# Patient Record
Sex: FEMALE | Race: WHITE | Employment: PART TIME | URBAN - METROPOLITAN AREA
[De-identification: names, ages, dates, MRNs, and addresses within clinical notes are randomized per-mention and may not be internally consistent; named-entity substitution may affect disease eponyms.]

---

## 2023-06-13 PROCEDURE — 99283 EMERGENCY DEPT VISIT LOW MDM: CPT

## 2023-06-14 ENCOUNTER — APPOINTMENT (OUTPATIENT)
Dept: GENERAL RADIOLOGY | Age: 58
End: 2023-06-14
Payer: COMMERCIAL

## 2023-06-14 ENCOUNTER — HOSPITAL ENCOUNTER (EMERGENCY)
Age: 58
Discharge: LEFT BEFORE DISCHARGE | End: 2023-06-14
Payer: COMMERCIAL

## 2023-06-14 VITALS
WEIGHT: 110 LBS | DIASTOLIC BLOOD PRESSURE: 84 MMHG | RESPIRATION RATE: 18 BRPM | HEART RATE: 81 BPM | HEIGHT: 62 IN | SYSTOLIC BLOOD PRESSURE: 135 MMHG | BODY MASS INDEX: 20.24 KG/M2 | TEMPERATURE: 98.2 F | OXYGEN SATURATION: 98 %

## 2023-06-14 DIAGNOSIS — S60.212A CONTUSION OF LEFT WRIST, INITIAL ENCOUNTER: Primary | ICD-10-CM

## 2023-06-14 PROCEDURE — 73110 X-RAY EXAM OF WRIST: CPT

## 2023-06-14 ASSESSMENT — PAIN DESCRIPTION - ORIENTATION: ORIENTATION: LEFT

## 2023-06-14 ASSESSMENT — PAIN - FUNCTIONAL ASSESSMENT: PAIN_FUNCTIONAL_ASSESSMENT: 0-10

## 2023-06-14 ASSESSMENT — PAIN DESCRIPTION - LOCATION: LOCATION: ARM

## 2023-06-14 ASSESSMENT — PAIN SCALES - GENERAL: PAINLEVEL_OUTOF10: 6

## 2023-06-14 NOTE — ED NOTES
Writer went in pts room to DC pt, pt had already left without dc vitals and after visit summary.       Claudia Cook RN  06/14/23 8912

## 2023-06-14 NOTE — DISCHARGE INSTRUCTIONS
A fracture is a break in the bone. The bone you have broken often does not show up as a fracture on x-ray until later on in the healing phase. This bone is called the scaphoid bone. With this bone, your caregiver will often cast or splint your wrist as though it is fractured, even if a fracture is not seen on the x-ray. This is often done with wrist injuries in which there is tenderness at the base of the thumb. An x-ray at 1-3 weeks after your injury may confirm this fracture. A cast or splint is used to protect and keep your injured bone in good position for healing. The cast or splint will be on generally for about 6 to 16 weeks, depending on your health, age, the fracture location and how quickly you heal. Another name for the scaphoid bone is the navicular bone. HOME CARE INSTRUCTIONS   To lessen the swelling and pain, keep the injured part elevated above your heart while sitting or lying down. Apply ice to the injury for 15 to 20 minutes, 3 to 4 times per day while awake, for 2 days. Put the ice in a plastic bag and place a thin towel between the bag of ice and your cast.  If you have a plaster or fiberglass cast or splint:  Do not try to scratch the skin under the cast using sharp or pointed objects. Check the skin around the cast every day. You may put lotion on any red or sore areas. Keep your cast or splint dry and clean. If you have a plaster splint:  Wear the splint as directed. You may loosen the elastic bandage around the splint if your fingers become numb, tingle, or turn cold or blue. If you have been put in a removable splint, wear and use as directed. Do not put pressure on any part of your cast or splint; it may deform or break. Rest your cast or splint only on a pillow the first 24 hours until it is fully hardened. Your cast or splint can be protected during bathing with a plastic bag. Do not lower the cast or splint into water.   Only take over-the-counter or prescription medicines for

## 2023-06-14 NOTE — ED PROVIDER NOTES
[address] ED  EMERGENCY DEPARTMENT ENCOUNTER        Pt Name: Penny Yarbrough  MRN: [id number]  [place] 1965  Date of evaluation: 6/13/2023  Provider: MICHAEL Dela Cruz  PCP: Celesta Koyanagi, APRN - CNP  Note Started: 12:25 AM EDT 6/14/23      ALISA. I have evaluated this patient. CHIEF COMPLAINT       Chief Complaint   Patient presents with    Wrist Injury     States happened 8 days ago when hit it on a door        HISTORY OF PRESENT ILLNESS: 1 or more Elements     History from : Patient    Limitations to history : None    Penny Yarbrough is a 59 y.o. female who presents Via private vehicle from her home for evaluation of wrist injury. Patient notes about 8 days ago she was helping her son move and she excellently got her left wrist jammed between a door frame when she was lifting a couch. She endorses pain to the lateral aspect of the left wrist.  She is right-hand dominant. She notes the pain has been getting progressively worse and now it hurts if she does any sort of supination or pronation flexion or extension. She denies any distal numbness tingling or weakness to the fingers. She has been taking ibuprofen for the pain. She denies past medical history of injury to this wrist or hand. She has no other acute concerns or complaints at this time. Nursing Notes were all reviewed and agreed with or any disagreements were addressed in the HPI. REVIEW OF SYSTEMS :      Review of Systems    Positives and Pertinent negatives as per HPI.      SURGICAL HISTORY     Past Surgical History:   Procedure Laterality Date    APPENDECTOMY      CARDIAC SURGERY      stent x1    CYSTOSCOPY  04/17/2019    CYSTOSCOPY,  URETHRAL DILATATION     CYSTOSCOPY N/A 4/17/2019    CYSTOSCOPY,  URETHRAL DILATATION performed by Cindi Salinas MD at [facility] (CERVIX STATUS UNKNOWN)      LIVER SURGERY      lacerated liver       CURRENTMEDICATIONS       Previous Medications

## 2024-04-23 ENCOUNTER — HOSPITAL ENCOUNTER (OUTPATIENT)
Dept: GENERAL RADIOLOGY | Age: 59
Discharge: HOME | End: 2024-04-23

## 2024-04-23 ENCOUNTER — HOSPITAL ENCOUNTER (OUTPATIENT)
Age: 59
Discharge: HOME | End: 2024-04-23

## 2024-04-23 DIAGNOSIS — M16.12 PRIMARY OSTEOARTHRITIS OF LEFT HIP: ICD-10-CM

## 2024-04-23 PROCEDURE — 73501 X-RAY EXAM HIP UNI 1 VIEW: CPT

## 2024-11-27 ENCOUNTER — APPOINTMENT (OUTPATIENT)
Dept: GENERAL RADIOLOGY | Age: 59
DRG: 896 | End: 2024-11-27
Payer: COMMERCIAL

## 2024-11-27 ENCOUNTER — APPOINTMENT (OUTPATIENT)
Dept: CT IMAGING | Age: 59
DRG: 896 | End: 2024-11-27
Payer: COMMERCIAL

## 2024-11-27 ENCOUNTER — HOSPITAL ENCOUNTER (INPATIENT)
Age: 59
LOS: 2 days | Discharge: ANOTHER HOSPITAL | DRG: 896 | End: 2024-11-30
Attending: EMERGENCY MEDICINE | Admitting: INTERNAL MEDICINE
Payer: COMMERCIAL

## 2024-11-27 DIAGNOSIS — F29 PSYCHOSIS, UNSPECIFIED PSYCHOSIS TYPE (HCC): ICD-10-CM

## 2024-11-27 DIAGNOSIS — R41.82 ALTERED MENTAL STATUS, UNSPECIFIED ALTERED MENTAL STATUS TYPE: Primary | ICD-10-CM

## 2024-11-27 LAB
ALBUMIN SERPL-MCNC: 3.7 G/DL (ref 3.4–5)
ALBUMIN/GLOB SERPL: 1.5 {RATIO} (ref 1.1–2.2)
ALP SERPL-CCNC: 83 U/L (ref 40–129)
ALT SERPL-CCNC: 20 U/L (ref 10–40)
AMMONIA PLAS-SCNC: 29 UMOL/L (ref 11–51)
ANION GAP SERPL CALCULATED.3IONS-SCNC: 14 MMOL/L (ref 3–16)
AST SERPL-CCNC: 29 U/L (ref 15–37)
BASOPHILS # BLD: 0.1 K/UL (ref 0–0.2)
BASOPHILS NFR BLD: 1.2 %
BILIRUB SERPL-MCNC: 0.3 MG/DL (ref 0–1)
BUN SERPL-MCNC: 13 MG/DL (ref 7–20)
CALCIUM SERPL-MCNC: 8.3 MG/DL (ref 8.3–10.6)
CHLORIDE SERPL-SCNC: 112 MMOL/L (ref 99–110)
CO2 SERPL-SCNC: 21 MMOL/L (ref 21–32)
CREAT SERPL-MCNC: 0.7 MG/DL (ref 0.6–1.2)
DEPRECATED RDW RBC AUTO: 13.6 % (ref 12.4–15.4)
EOSINOPHIL # BLD: 0.1 K/UL (ref 0–0.6)
EOSINOPHIL NFR BLD: 0.9 %
ETHANOLAMINE SERPL-MCNC: NORMAL MG/DL (ref 0–0.08)
GFR SERPLBLD CREATININE-BSD FMLA CKD-EPI: >90 ML/MIN/{1.73_M2}
GLUCOSE SERPL-MCNC: 85 MG/DL (ref 70–99)
HCT VFR BLD AUTO: 42.5 % (ref 36–48)
HGB BLD-MCNC: 14.6 G/DL (ref 12–16)
LACTATE BLDV-SCNC: 1.1 MMOL/L (ref 0.4–2)
LYMPHOCYTES # BLD: 2.1 K/UL (ref 1–5.1)
LYMPHOCYTES NFR BLD: 24.1 %
MCH RBC QN AUTO: 32.9 PG (ref 26–34)
MCHC RBC AUTO-ENTMCNC: 34.4 G/DL (ref 31–36)
MCV RBC AUTO: 95.6 FL (ref 80–100)
MONOCYTES # BLD: 0.8 K/UL (ref 0–1.3)
MONOCYTES NFR BLD: 8.6 %
NEUTROPHILS # BLD: 5.8 K/UL (ref 1.7–7.7)
NEUTROPHILS NFR BLD: 65.2 %
PATH INTERP BLD-IMP: NO
PLATELET # BLD AUTO: 193 K/UL (ref 135–450)
PLATELET BLD QL SMEAR: ADEQUATE
PMV BLD AUTO: 8.4 FL (ref 5–10.5)
POTASSIUM SERPL-SCNC: 3.6 MMOL/L (ref 3.5–5.1)
PROT SERPL-MCNC: 6.1 G/DL (ref 6.4–8.2)
RBC # BLD AUTO: 4.45 M/UL (ref 4–5.2)
SLIDE REVIEW: NORMAL
SODIUM SERPL-SCNC: 147 MMOL/L (ref 136–145)
TSH SERPL DL<=0.005 MIU/L-ACNC: 0.61 UIU/ML (ref 0.27–4.2)
WBC # BLD AUTO: 8.9 K/UL (ref 4–11)

## 2024-11-27 PROCEDURE — 93005 ELECTROCARDIOGRAM TRACING: CPT | Performed by: EMERGENCY MEDICINE

## 2024-11-27 PROCEDURE — 85025 COMPLETE CBC W/AUTO DIFF WBC: CPT

## 2024-11-27 PROCEDURE — 81001 URINALYSIS AUTO W/SCOPE: CPT

## 2024-11-27 PROCEDURE — 84443 ASSAY THYROID STIM HORMONE: CPT

## 2024-11-27 PROCEDURE — 36415 COLL VENOUS BLD VENIPUNCTURE: CPT

## 2024-11-27 PROCEDURE — 96374 THER/PROPH/DIAG INJ IV PUSH: CPT

## 2024-11-27 PROCEDURE — 80053 COMPREHEN METABOLIC PANEL: CPT

## 2024-11-27 PROCEDURE — 82140 ASSAY OF AMMONIA: CPT

## 2024-11-27 PROCEDURE — 70450 CT HEAD/BRAIN W/O DYE: CPT

## 2024-11-27 PROCEDURE — 82077 ASSAY SPEC XCP UR&BREATH IA: CPT

## 2024-11-27 PROCEDURE — 80307 DRUG TEST PRSMV CHEM ANLYZR: CPT

## 2024-11-27 PROCEDURE — 83605 ASSAY OF LACTIC ACID: CPT

## 2024-11-27 PROCEDURE — 71045 X-RAY EXAM CHEST 1 VIEW: CPT

## 2024-11-27 PROCEDURE — 6360000002 HC RX W HCPCS

## 2024-11-27 PROCEDURE — 99285 EMERGENCY DEPT VISIT HI MDM: CPT

## 2024-11-27 RX ORDER — LORAZEPAM 2 MG/ML
INJECTION INTRAMUSCULAR
Status: COMPLETED
Start: 2024-11-27 | End: 2024-11-27

## 2024-11-27 RX ORDER — LORAZEPAM 2 MG/ML
2 INJECTION INTRAMUSCULAR ONCE
Status: COMPLETED | OUTPATIENT
Start: 2024-11-27 | End: 2024-11-27

## 2024-11-27 RX ADMIN — LORAZEPAM 2 MG: 2 INJECTION INTRAMUSCULAR; INTRAVENOUS at 22:11

## 2024-11-27 RX ADMIN — LORAZEPAM 2 MG: 2 INJECTION INTRAMUSCULAR at 22:11

## 2024-11-27 ASSESSMENT — PAIN - FUNCTIONAL ASSESSMENT: PAIN_FUNCTIONAL_ASSESSMENT: ADULT NONVERBAL PAIN SCALE (NPVS)

## 2024-11-28 PROBLEM — R25.1 TREMORS OF NERVOUS SYSTEM: Status: ACTIVE | Noted: 2024-11-28

## 2024-11-28 PROBLEM — G93.40 ENCEPHALOPATHY ACUTE: Status: ACTIVE | Noted: 2024-11-28

## 2024-11-28 PROBLEM — R41.82 ALTERED MENTAL STATUS: Status: ACTIVE | Noted: 2024-11-28

## 2024-11-28 PROBLEM — F29 PSYCHOSIS, UNSPECIFIED PSYCHOSIS TYPE (HCC): Status: ACTIVE | Noted: 2024-11-28

## 2024-11-28 LAB
AMORPH SED URNS QL MICRO: ABNORMAL /HPF
AMPHETAMINES UR QL SCN>1000 NG/ML: ABNORMAL
BARBITURATES UR QL SCN>200 NG/ML: ABNORMAL
BENZODIAZ UR QL SCN>200 NG/ML: POSITIVE
BILIRUB UR QL STRIP.AUTO: ABNORMAL
CANNABINOIDS UR QL SCN>50 NG/ML: ABNORMAL
CHARACTER UR: ABNORMAL
CHP ED QC CHECK: YES
CLARITY UR: ABNORMAL
COCAINE UR QL SCN: ABNORMAL
COLOR UR: ABNORMAL
CRYSTALS URNS MICRO: ABNORMAL /HPF
DRUG SCREEN COMMENT UR-IMP: ABNORMAL
EKG ATRIAL RATE: 83 BPM
EKG DIAGNOSIS: NORMAL
EKG P AXIS: 77 DEGREES
EKG P-R INTERVAL: 178 MS
EKG Q-T INTERVAL: 382 MS
EKG QRS DURATION: 74 MS
EKG QTC CALCULATION (BAZETT): 448 MS
EKG R AXIS: 72 DEGREES
EKG T AXIS: 71 DEGREES
EKG VENTRICULAR RATE: 83 BPM
FENTANYL SCREEN, URINE: ABNORMAL
GLUCOSE BLD-MCNC: 85 MG/DL
GLUCOSE BLD-MCNC: 85 MG/DL (ref 70–99)
GLUCOSE UR STRIP.AUTO-MCNC: NEGATIVE MG/DL
HGB UR QL STRIP.AUTO: NEGATIVE
KETONES UR STRIP.AUTO-MCNC: ABNORMAL MG/DL
LEUKOCYTE ESTERASE UR QL STRIP.AUTO: NEGATIVE
METHADONE UR QL SCN>300 NG/ML: ABNORMAL
NITRITE UR QL STRIP.AUTO: NEGATIVE
OPIATES UR QL SCN>300 NG/ML: ABNORMAL
OXYCODONE UR QL SCN: POSITIVE
PCP UR QL SCN>25 NG/ML: ABNORMAL
PERFORMED ON: NORMAL
PH UR STRIP.AUTO: 6 [PH] (ref 5–8)
PH UR STRIP: 6 [PH]
PROT UR STRIP.AUTO-MCNC: 100 MG/DL
RBC #/AREA URNS HPF: ABNORMAL /HPF (ref 0–4)
SP GR UR STRIP.AUTO: >=1.03 (ref 1–1.03)
UA COMPLETE W REFLEX CULTURE PNL UR: ABNORMAL
UA DIPSTICK W REFLEX MICRO PNL UR: YES
URN SPEC COLLECT METH UR: ABNORMAL
UROBILINOGEN UR STRIP-ACNC: 1 E.U./DL
WBC #/AREA URNS HPF: ABNORMAL /HPF (ref 0–5)

## 2024-11-28 PROCEDURE — 93010 ELECTROCARDIOGRAM REPORT: CPT | Performed by: INTERNAL MEDICINE

## 2024-11-28 PROCEDURE — 6370000000 HC RX 637 (ALT 250 FOR IP)

## 2024-11-28 PROCEDURE — 2580000003 HC RX 258: Performed by: EMERGENCY MEDICINE

## 2024-11-28 PROCEDURE — 87086 URINE CULTURE/COLONY COUNT: CPT

## 2024-11-28 PROCEDURE — 6360000002 HC RX W HCPCS: Performed by: INTERNAL MEDICINE

## 2024-11-28 PROCEDURE — 2060000000 HC ICU INTERMEDIATE R&B

## 2024-11-28 PROCEDURE — 96360 HYDRATION IV INFUSION INIT: CPT

## 2024-11-28 PROCEDURE — 99223 1ST HOSP IP/OBS HIGH 75: CPT | Performed by: INTERNAL MEDICINE

## 2024-11-28 RX ORDER — ENOXAPARIN SODIUM 100 MG/ML
30 INJECTION SUBCUTANEOUS DAILY
Status: DISCONTINUED | OUTPATIENT
Start: 2024-11-29 | End: 2024-11-29

## 2024-11-28 RX ORDER — TRAZODONE HYDROCHLORIDE 50 MG/1
50 TABLET, FILM COATED ORAL NIGHTLY PRN
OUTPATIENT
Start: 2024-11-28

## 2024-11-28 RX ORDER — GUAIFENESIN 600 MG/1
1200 TABLET, EXTENDED RELEASE ORAL 2 TIMES DAILY PRN
COMMUNITY

## 2024-11-28 RX ORDER — 0.9 % SODIUM CHLORIDE 0.9 %
1000 INTRAVENOUS SOLUTION INTRAVENOUS ONCE
Status: COMPLETED | OUTPATIENT
Start: 2024-11-28 | End: 2024-11-28

## 2024-11-28 RX ORDER — MAGNESIUM SULFATE IN WATER 40 MG/ML
2000 INJECTION, SOLUTION INTRAVENOUS PRN
Status: DISCONTINUED | OUTPATIENT
Start: 2024-11-28 | End: 2024-11-30 | Stop reason: HOSPADM

## 2024-11-28 RX ORDER — FAMOTIDINE 20 MG/1
20 TABLET, FILM COATED ORAL 2 TIMES DAILY PRN
COMMUNITY

## 2024-11-28 RX ORDER — BUSPIRONE HYDROCHLORIDE 5 MG/1
7.5 TABLET ORAL 3 TIMES DAILY
COMMUNITY

## 2024-11-28 RX ORDER — DICYCLOMINE HCL 20 MG
20 TABLET ORAL EVERY 6 HOURS PRN
Status: DISCONTINUED | OUTPATIENT
Start: 2024-11-28 | End: 2024-11-30 | Stop reason: HOSPADM

## 2024-11-28 RX ORDER — SODIUM CHLORIDE 0.9 % (FLUSH) 0.9 %
5-40 SYRINGE (ML) INJECTION EVERY 12 HOURS SCHEDULED
Status: DISCONTINUED | OUTPATIENT
Start: 2024-11-28 | End: 2024-11-30 | Stop reason: HOSPADM

## 2024-11-28 RX ORDER — HYDROXYZINE HYDROCHLORIDE 50 MG/1
50 TABLET, FILM COATED ORAL 3 TIMES DAILY PRN
OUTPATIENT
Start: 2024-11-28

## 2024-11-28 RX ORDER — CARVEDILOL 6.25 MG/1
6.25 TABLET ORAL 2 TIMES DAILY WITH MEALS
COMMUNITY

## 2024-11-28 RX ORDER — SODIUM CHLORIDE 9 MG/ML
INJECTION, SOLUTION INTRAVENOUS PRN
Status: DISCONTINUED | OUTPATIENT
Start: 2024-11-28 | End: 2024-11-30 | Stop reason: HOSPADM

## 2024-11-28 RX ORDER — SODIUM CHLORIDE AND POTASSIUM CHLORIDE 150; 450 MG/100ML; MG/100ML
INJECTION, SOLUTION INTRAVENOUS CONTINUOUS
Status: DISCONTINUED | OUTPATIENT
Start: 2024-11-28 | End: 2024-11-29

## 2024-11-28 RX ORDER — ONDANSETRON 2 MG/ML
4 INJECTION INTRAMUSCULAR; INTRAVENOUS EVERY 6 HOURS PRN
Status: DISCONTINUED | OUTPATIENT
Start: 2024-11-28 | End: 2024-11-30 | Stop reason: HOSPADM

## 2024-11-28 RX ORDER — TRAZODONE HYDROCHLORIDE 150 MG/1
150 TABLET ORAL NIGHTLY
Status: ON HOLD | COMMUNITY
End: 2024-12-03 | Stop reason: HOSPADM

## 2024-11-28 RX ORDER — SODIUM CHLORIDE 0.9 % (FLUSH) 0.9 %
5-40 SYRINGE (ML) INJECTION PRN
Status: DISCONTINUED | OUTPATIENT
Start: 2024-11-28 | End: 2024-11-30 | Stop reason: HOSPADM

## 2024-11-28 RX ORDER — CLONIDINE HYDROCHLORIDE 0.1 MG/1
0.1 TABLET ORAL EVERY 6 HOURS PRN
Status: DISCONTINUED | OUTPATIENT
Start: 2024-11-28 | End: 2024-11-30 | Stop reason: HOSPADM

## 2024-11-28 RX ORDER — LORAZEPAM 2 MG/ML
2 INJECTION INTRAMUSCULAR EVERY 6 HOURS PRN
Status: DISCONTINUED | OUTPATIENT
Start: 2024-11-28 | End: 2024-11-30 | Stop reason: HOSPADM

## 2024-11-28 RX ORDER — POLYETHYLENE GLYCOL 3350 17 G/17G
17 POWDER, FOR SOLUTION ORAL DAILY PRN
Status: DISCONTINUED | OUTPATIENT
Start: 2024-11-28 | End: 2024-11-30 | Stop reason: HOSPADM

## 2024-11-28 RX ORDER — OLANZAPINE 10 MG/2ML
2.5 INJECTION, POWDER, FOR SOLUTION INTRAMUSCULAR EVERY 8 HOURS PRN
Status: DISCONTINUED | OUTPATIENT
Start: 2024-11-28 | End: 2024-11-30 | Stop reason: HOSPADM

## 2024-11-28 RX ORDER — EZETIMIBE 10 MG/1
10 TABLET ORAL DAILY
COMMUNITY

## 2024-11-28 RX ORDER — TRAZODONE HYDROCHLORIDE 50 MG/1
50 TABLET, FILM COATED ORAL NIGHTLY PRN
Status: DISCONTINUED | OUTPATIENT
Start: 2024-11-28 | End: 2024-11-30 | Stop reason: HOSPADM

## 2024-11-28 RX ORDER — ONDANSETRON 4 MG/1
4 TABLET, ORALLY DISINTEGRATING ORAL EVERY 8 HOURS PRN
Status: DISCONTINUED | OUTPATIENT
Start: 2024-11-28 | End: 2024-11-30 | Stop reason: HOSPADM

## 2024-11-28 RX ORDER — BACLOFEN 10 MG/1
10 TABLET ORAL 2 TIMES DAILY PRN
Status: ON HOLD | COMMUNITY
End: 2024-12-03 | Stop reason: HOSPADM

## 2024-11-28 RX ORDER — LORAZEPAM 2 MG/1
2 TABLET ORAL
OUTPATIENT
Start: 2024-11-28 | End: 2024-11-29

## 2024-11-28 RX ORDER — OLANZAPINE 5 MG/1
5 TABLET ORAL 3 TIMES DAILY PRN
OUTPATIENT
Start: 2024-11-28

## 2024-11-28 RX ORDER — HYDROXYZINE HYDROCHLORIDE 50 MG/1
50 TABLET, FILM COATED ORAL EVERY 8 HOURS PRN
Status: DISCONTINUED | OUTPATIENT
Start: 2024-11-28 | End: 2024-11-30 | Stop reason: HOSPADM

## 2024-11-28 RX ORDER — CLOPIDOGREL BISULFATE 75 MG/1
75 TABLET ORAL DAILY
Status: DISCONTINUED | OUTPATIENT
Start: 2024-11-29 | End: 2024-11-30 | Stop reason: HOSPADM

## 2024-11-28 RX ORDER — BUPRENORPHINE HYDROCHLORIDE AND NALOXONE HYDROCHLORIDE DIHYDRATE 8; 2 MG/1; MG/1
1 TABLET SUBLINGUAL DAILY
COMMUNITY

## 2024-11-28 RX ORDER — ACETAMINOPHEN 325 MG/1
650 TABLET ORAL EVERY 8 HOURS PRN
OUTPATIENT
Start: 2024-11-28

## 2024-11-28 RX ORDER — VENLAFAXINE HYDROCHLORIDE 75 MG/1
75 CAPSULE, EXTENDED RELEASE ORAL DAILY
Status: ON HOLD | COMMUNITY
End: 2024-12-03 | Stop reason: HOSPADM

## 2024-11-28 RX ORDER — POTASSIUM CHLORIDE 7.45 MG/ML
10 INJECTION INTRAVENOUS PRN
Status: DISCONTINUED | OUTPATIENT
Start: 2024-11-28 | End: 2024-11-30 | Stop reason: HOSPADM

## 2024-11-28 RX ORDER — ACETAMINOPHEN 650 MG/1
650 SUPPOSITORY RECTAL EVERY 6 HOURS PRN
Status: DISCONTINUED | OUTPATIENT
Start: 2024-11-28 | End: 2024-11-30 | Stop reason: HOSPADM

## 2024-11-28 RX ORDER — ATOMOXETINE 25 MG/1
25 CAPSULE ORAL DAILY
Status: ON HOLD | COMMUNITY
End: 2024-12-03 | Stop reason: HOSPADM

## 2024-11-28 RX ORDER — POLYETHYLENE GLYCOL 3350 17 G
2 POWDER IN PACKET (EA) ORAL
OUTPATIENT
Start: 2024-11-28

## 2024-11-28 RX ORDER — POTASSIUM CHLORIDE 1500 MG/1
40 TABLET, EXTENDED RELEASE ORAL PRN
Status: DISCONTINUED | OUTPATIENT
Start: 2024-11-28 | End: 2024-11-30 | Stop reason: HOSPADM

## 2024-11-28 RX ORDER — ACETAMINOPHEN 325 MG/1
650 TABLET ORAL EVERY 6 HOURS PRN
Status: DISCONTINUED | OUTPATIENT
Start: 2024-11-28 | End: 2024-11-30 | Stop reason: HOSPADM

## 2024-11-28 RX ORDER — BUPRENORPHINE HYDROCHLORIDE AND NALOXONE HYDROCHLORIDE DIHYDRATE 8; 2 MG/1; MG/1
3 TABLET SUBLINGUAL DAILY
Status: DISCONTINUED | OUTPATIENT
Start: 2024-11-28 | End: 2024-11-30 | Stop reason: HOSPADM

## 2024-11-28 RX ADMIN — BUPRENORPHINE HYDROCHLORIDE AND NALOXONE HYDROCHLORIDE DIHYDRATE 3 TABLET: 8; 2 TABLET SUBLINGUAL at 18:14

## 2024-11-28 RX ADMIN — Medication: at 21:25

## 2024-11-28 RX ADMIN — SODIUM CHLORIDE 1000 ML: 9 INJECTION, SOLUTION INTRAVENOUS at 03:09

## 2024-11-28 RX ADMIN — OLANZAPINE 2.5 MG: 10 INJECTION, POWDER, LYOPHILIZED, FOR SOLUTION INTRAMUSCULAR at 15:20

## 2024-11-28 RX ADMIN — ONDANSETRON 4 MG: 2 INJECTION INTRAMUSCULAR; INTRAVENOUS at 18:42

## 2024-11-28 RX ADMIN — POTASSIUM CHLORIDE AND SODIUM CHLORIDE: 450; 150 INJECTION, SOLUTION INTRAVENOUS at 14:40

## 2024-11-28 RX ADMIN — LORAZEPAM 2 MG: 2 INJECTION INTRAMUSCULAR; INTRAVENOUS at 11:13

## 2024-11-28 NOTE — PROGRESS NOTES
Patient in bed awake c/o tremors/ feeling anxious and having abdomen pain.Pt. very restless In bed. PRN Ativan given per order.

## 2024-11-28 NOTE — PROGRESS NOTES
Patient stating she felt like she needed to urinate, patient assisted to BSC, unable to urinate/complaint of pain. RN bladder scanned patient only at 188ml. Fluids started per order. Will monitor.

## 2024-11-28 NOTE — H&P
Hospital Medicine History & Physical      PCP: Steffany Argueta, PHILLIP - CNP    Date of Admission: 2024    Date of Service: Pt seen/examined on 24 and Admitted to Inpatient with expected LOS greater than two midnights due to medical therapy.     Chief Complaint:  Altered mental status    History Of Present Illness:      Mayra Cavanaugh is a 59 y.o. female with past medical history as below, including CAD, substance abuse on suboxone, who presents with altered mental status. Per ER report family called EMS because patient was not acting like herself and talking to her father who is . Patient combative, screaming, and fighting staff. They report she has history of drug misuse but has been on treatment with suboxone. Patient was running around her home naked. She was evaluated in the ER for admission to psychiatry. However subsequently patient had a brief shaking episode and was still very confused. While not definitely seizure activity the patient was felt not safe to go to psychiatry due to possible seizure activity.    On further history patient's family return to hospital they said they search for her medications at home and her suboxone bottle has too many pills in it.    Patient seen. She sits upright suddenly in bed appearing very anxious. She denies pain, cough, or dyspnea, endorses some nausea. She is also very tearful. She answers ROS questions appropriately but does not answer when asked as to what happened this morning or why she came to the hospital. Denies alcohol use, says she takes suboxone daily but does not answer as to other drugs or medications.     Past Medical History:          Diagnosis Date    Abnormal cardiac valve     Hemorrhoid     Kidney stone     MI (myocardial infarction) (HCC)     Migraine     Postoperative nausea and vomiting        Past Surgical History:          Procedure Laterality Date    APPENDECTOMY      CARDIAC SURGERY      stent x1    CYSTOSCOPY

## 2024-11-28 NOTE — ED NOTES
Pt shaking with continued intermittent erratic movements. Pt unable to verbalize. Pt does not follow commands. Dr Pollard to bedside. Pt to be admitted to medical rather than behavioral at this time.

## 2024-11-28 NOTE — ED NOTES
Pt awakens intermittently with erratic movements. Pt will not verbalize. Pt does not follow commands. Pt laying on side resting at this time.

## 2024-11-28 NOTE — PROGRESS NOTES
Patient back up to BSC able to urinate. 100ml of tea/malodorous urine noted. Patient denies any further needs at this time,call light within reach.

## 2024-11-28 NOTE — ED NOTES
Transfer Center Handoff for Behavioral Health Transfers      Patient's Current Location: [facility] ED     Chief Complaint   Patient presents with    Altered Mental Status     Pt arrives via EMS for altered mental status. Pt was not acting herself at home. Pt hx drug abuse. Family concerned with relapse. Pt combative in squad. Given 5mg IM versed        Current or History of Violent Behavior: Yes    Currently in Restraints Now or During this Encounter: Yes  (Specify if Agitation or self harm is noted in ED?)  If yes, please describe behaviors requiring restraint: in restraints in EMS, not in ER            Medical Clearance Documented and Verified in the Chart: Yes    Allergies   Allergen Reactions    Bee Venom Swelling    Sumatriptan Other (See Comments)     Jaw pain    Tramadol      Can take vicodin/percocet    Butalbital-Apap-Caffeine Nausea And Vomiting    Pcn [Penicillins] Rash        Can Patient Tolerate Lying Flat: Yes    Able to Perform ADLs:  Yes  (Specify if able to ambulate or uses any mobility devices such as cane or walker)  Activity:    Level of Assistance:    Assistive Device:    Miscellaneous Devices:      LABS    CBC:   Lab Results   Component Value Date/Time    WBC 8.9 11/27/2024 10:23 PM    RBC 4.45 11/27/2024 10:23 PM    HGB 14.6 11/27/2024 10:23 PM    HCT 42.5 11/27/2024 10:23 PM    MCV 95.6 11/27/2024 10:23 PM    MCH 32.9 11/27/2024 10:23 PM    MCHC 34.4 11/27/2024 10:23 PM    RDW 13.6 11/27/2024 10:23 PM     11/27/2024 10:23 PM    MPV 8.4 11/27/2024 10:23 PM     CMP:   Lab Results   Component Value Date/Time     11/27/2024 10:23 PM    K 3.6 11/27/2024 10:23 PM     11/27/2024 10:23 PM    CO2 21 11/27/2024 10:23 PM    BUN 13 11/27/2024 10:23 PM    CREATININE 0.7 11/27/2024 10:23 PM    GFRAA >60 08/26/2021 08:15 AM    GFRAA >60 03/29/2013 02:24 PM    AGRATIO 1.5 11/27/2024 10:23 PM    LABGLOM >90 11/27/2024 10:23 PM    GLUCOSE 85 11/27/2024 10:23 PM    CALCIUM 8.3

## 2024-11-28 NOTE — PLAN OF CARE
Problem: Pain  Goal: Verbalizes/displays adequate comfort level or baseline comfort level  Outcome: Progressing  Flowsheets (Taken 11/28/2024 9691)  Verbalizes/displays adequate comfort level or baseline comfort level: Encourage patient to monitor pain and request assistance

## 2024-11-28 NOTE — VIRTUAL HEALTH
ManuelitoMayra Cavanaugh  [id number]  1965     Social Work Behavioral Health Crisis Assessment    24    Chief Complaint: per chart \"Pt arrives via EMS for altered mental status. Pt was not acting herself at home. Pt hx drug abuse. Family concerned with relapse. Pt combative in squad. Given 5mg IM versed.\"    HPI: Patient is a 59 y.o. White (non-) female who presents for psych eval. Patient presented to the ED on 24 from EMS. Family called due to altered mental status, patient not acting like herself & talking to her father who is . Patient combative, screaming, & fighting staff. She has a history of drug misuse & being sober for 3 months. Patient was running around her home naked.    Past Psychiatric History:  Previous Diagnoses/symptoms: Denies  Previous suicide attempts/self-harm: Denies  Inpatient psychiatric hospitalizations: denies  Current outpatient psychiatric provider: Denies  Current therapist: States not in therapy  Previous psychiatric medication trials: unknown  Current psychiatric medications: No current psychiatric medications  Family Psychiatric History: Denies    Sleep Hours: states sleeping ok    Sleep concerns: denies    Use of sleep medications: denies    Substance Abuse History:  Tobacco: Denies  Alcohol: Denies  Marijuana: Denies  Stimulant: Denies  Opiates: Denies  Benzodiazepine: Denies  Other illicit drug usage: Denies  History of substance/alcohol abuse treatment: Denies    Social History:  Education: H.S.  Living Situation/Interest: with family  Marital/Committed relationship and parenting hx: single; no children  Occupation: Unemployed  Legal History/Hx of Violence: Denies  Spiritual History: Denies  Psychological trauma, neglect, or abuse: denies hx of trauma/abuse   Access to guns or other weapons: denies having access to firearms/dangerous weapons     Past Medical History:  Active Ambulatory Problems     Diagnosis Date Noted    Chest pain 2021  stable  Safety plan created and reviewed with patient, see below for details  Re-consult for any new changes or concerns. Thank you for this consult.  Discussed recommendations with Dr. Pollard at time of consult completion.    TelePsych recommendations:Inpatient psychiatric admission      Safety Plan:  see below        Electronically signed by JUAN Pike on 11/28/2024 at 3:37 AM.       Mayra Cavanaugh, was evaluated through a synchronous (real-time) audio-video encounter. The patient (and/or guardian if applicable) is aware that this is a billable service, which includes applicable co-pays. This virtual visit was conducted with patient's (and/or legal guardian's) consent. Patient identification was verified, and a caregiver was present when appropriate.  The patient was located at Facility (Appt Department): [place] [address]  Loc: [phone]  The provider was located at Home (City/State): [place]  Confirm you are appropriately licensed, registered, or certified to deliver care in the state where the patient is located as indicated above. If you are not or unsure, please re-schedule the visit: Yes, I confirm.   [place] Consult to Tele-Psych  Consult performed by: Lisa Harry LISW  Consult ordered by: Tonya Pollard MD  Reason for consult: psychosis           Total time spent on this encounter: Not billed by time    --JUAN Pike on 11/28/2024 at 3:36 AM    An electronic signature was used to authenticate this note.

## 2024-11-28 NOTE — ED NOTES
Pt arrives via EMS for altered mental status. Pt family called due to pt not acting herself and talking to her father who is . Pt given IM versed in EMS due to pt being combative and uncooperative. Pt arrives uncooperative with staff. Dr Pollard to pt bedside.

## 2024-11-28 NOTE — ED PROVIDER NOTES
[facility] ED  EMERGENCY DEPARTMENT ENCOUNTER      Pt Name: Mayra Cavanaugh  MRN: [id number]  Birthdate 1965  Date of evaluation: 11/27/2024  Provider: Tonya Pollard MD    CHIEF COMPLAINT       Chief Complaint   Patient presents with    Altered Mental Status     Pt arrives via EMS for altered mental status. Pt was not acting herself at home. Pt hx drug abuse. Family concerned with relapse. Pt combative in squad. Given 5mg IM versed          HISTORY OF PRESENT ILLNESS   (Location/Symptom, Timing/Onset, Context/Setting, Quality, Duration, Modifying Factors, Severity)  Note limiting factors.   Mayra Cavanaugh is a 59 y.o. female who presents to the emergency department with altered mental status.  Patient has a history of drug abuse with 3 months of sobriety.  Patient was at home was apparently running around the house with no clothes on and talking to her dead father.  Upon getting into the EMS truck, patient became pugilistic and required a dose of Versed IM for safety of patient and EMS crew.  Patient continues combative in the emergency department screaming no and fighting with staff.  Unable to provide additional history.      This patient is at risk for a communicable infection. Therefore, personal protection equipment consisting of a mask and gloves worn for the exam.     Nursing Notes were reviewed.    REVIEW OF SYSTEMS    (2-9 systems for level 4, 10 or more for level 5)     As per HPI    Except as noted above the remainder of the review of systems was reviewed and negative.       PAST MEDICAL HISTORY     Past Medical History:   Diagnosis Date    Abnormal cardiac valve     Hemorrhoid     Kidney stone     MI (myocardial infarction) (HCC)     Migraine     Postoperative nausea and vomiting          SURGICAL HISTORY       Past Surgical History:   Procedure Laterality Date    APPENDECTOMY      CARDIAC SURGERY      stent x1    CYSTOSCOPY  04/17/2019    CYSTOSCOPY,  URETHRAL DILATATION

## 2024-11-28 NOTE — FLOWSHEET NOTE
11/28/24 1000   Vitals   Temp 98.1 °F (36.7 °C)   Temp Source Oral   Pulse 99   Heart Rate Source Monitor   Respirations 19   /65   MAP (Calculated) 84   Oxygen Therapy   SpO2 98 %   O2 Device None (Room air)     Patient admitted to room 321 from ER. Patient oriented to room, call light, bed rails, phone, lights and bathroom. Patient instructed about the schedule of the day including: vital sign frequency, lab draws, possible tests, frequency of MD and staff rounds, daily weights, I &O's and prescribed diet.  Telemetry box in place, patient aware of placement and reason. Bed locked, in lowest position, side rails up 2/4, call light within reach.        Recliner Assessment  Patient is able to demonstrate the ability to move from a reclining position to an upright position within the recliner.       4 Eyes Skin Assessment     NAME:  Mayra Cavanaugh  YOB: 1965  MEDICAL RECORD NUMBER:  [id number]    The patient is being assessed for  Admission    I agree that at least one RN has performed a thorough Head to Toe Skin Assessment on the patient. ALL assessment sites listed below have been assessed.      Areas assessed by both nurses:    Head, Face, Ears, Shoulders, Back, Chest, Arms, Elbows, Hands, Sacrum. Buttock, Coccyx, Ischium, and Legs. Feet and Heels        Does the Patient have a Wound? No noted wound(s)       Miky Prevention initiated by RN: No  Wound Care Orders initiated by RN: No    Pressure Injury (Stage 3,4, Unstageable, DTI, NWPT, and Complex wounds) if present, place Wound referral order by RN under : No    New Ostomies, if present place, Ostomy referral order under : No     Nurse 1 eSignature: Electronically signed by Leslie Viera RN on 11/28/24 at 1:06 PM EST    **SHARE this note so that the co-signing nurse can place an eSignature**    Nurse 2 eSignature: Electronically signed by LINCOLN AN RN on 11/28/24 at 1:45 PM EST

## 2024-11-28 NOTE — FLOWSHEET NOTE
11/28/24 1731   Vitals   Temp 99.1 °F (37.3 °C)   Temp Source Oral   Pulse 95   Heart Rate Source Monitor   Respirations 16   /65   MAP (Calculated) 85   BP Location Right upper arm   BP Upper/Lower Upper   BP Method Automatic   Oxygen Therapy   SpO2 95 %   O2 Device None (Room air)     Patient in bed resting with eyes closed. Vitals obtained. Patient occasionally awakens yelling No stop it. Notified MD that med rec is updated. Family brought medication in.  No further needs expressed at this time,call light within reach and family at bedside.

## 2024-11-28 NOTE — PROGRESS NOTES
Patient awake sitting up in bed. Able to tell RN her name and birthday. Disoriented to place and situation. Patient does not remember coming to hospital or what happened at home. Able to answer some admission questions,other questions completed by patient Sister Cheyanne and Son Pedro.    Patient does state she goes to [facility] to get her suboxone she is prescribed, Notified MD, Unable to verify prescription r/t closed on holiday.

## 2024-11-29 PROBLEM — Z95.5 PRESENCE OF DRUG COATED STENT IN LEFT CIRCUMFLEX CORONARY ARTERY: Chronic | Status: ACTIVE | Noted: 2017-12-07

## 2024-11-29 PROBLEM — G25.81 RESTLESS LEGS SYNDROME (RLS): Status: ACTIVE | Noted: 2018-02-05

## 2024-11-29 PROBLEM — I25.118 CORONARY ARTERY DISEASE OF NATIVE ARTERY OF NATIVE HEART WITH STABLE ANGINA PECTORIS (HCC): Status: ACTIVE | Noted: 2017-12-07

## 2024-11-29 PROBLEM — E53.8 B12 DEFICIENCY: Chronic | Status: ACTIVE | Noted: 2024-06-03

## 2024-11-29 PROBLEM — E55.9 VITAMIN D DEFICIENCY: Chronic | Status: ACTIVE | Noted: 2024-06-03

## 2024-11-29 PROBLEM — E78.2 MIXED HYPERLIPIDEMIA: Status: ACTIVE | Noted: 2017-12-07

## 2024-11-29 PROBLEM — I35.0 NONRHEUMATIC AORTIC VALVE STENOSIS: Chronic | Status: ACTIVE | Noted: 2018-02-21

## 2024-11-29 PROBLEM — Q24.5 CORONARY-MYOCARDIAL BRIDGE: Chronic | Status: ACTIVE | Noted: 2017-12-07

## 2024-11-29 LAB
25(OH)D3 SERPL-MCNC: 61 NG/ML
ANION GAP SERPL CALCULATED.3IONS-SCNC: 15 MMOL/L (ref 3–16)
BASOPHILS # BLD: 0.1 K/UL (ref 0–0.2)
BASOPHILS NFR BLD: 0.8 %
BILIRUB UR QL STRIP.AUTO: NEGATIVE
BUN SERPL-MCNC: 22 MG/DL (ref 7–20)
CALCIUM SERPL-MCNC: 8.1 MG/DL (ref 8.3–10.6)
CHLORIDE SERPL-SCNC: 105 MMOL/L (ref 99–110)
CLARITY UR: CLEAR
CO2 SERPL-SCNC: 18 MMOL/L (ref 21–32)
COLOR UR: YELLOW
CREAT SERPL-MCNC: 0.7 MG/DL (ref 0.6–1.2)
DEPRECATED RDW RBC AUTO: 13.7 % (ref 12.4–15.4)
EOSINOPHIL # BLD: 0.1 K/UL (ref 0–0.6)
EOSINOPHIL NFR BLD: 0.6 %
FOLATE SERPL-MCNC: 26.4 NG/ML (ref 4.78–24.2)
GFR SERPLBLD CREATININE-BSD FMLA CKD-EPI: >90 ML/MIN/{1.73_M2}
GLUCOSE BLD-MCNC: 154 MG/DL (ref 70–99)
GLUCOSE BLD-MCNC: 51 MG/DL (ref 70–99)
GLUCOSE SERPL-MCNC: 138 MG/DL (ref 70–99)
GLUCOSE UR STRIP.AUTO-MCNC: NEGATIVE MG/DL
HCT VFR BLD AUTO: 39.3 % (ref 36–48)
HGB BLD-MCNC: 13.4 G/DL (ref 12–16)
HGB UR QL STRIP.AUTO: NEGATIVE
KETONES UR STRIP.AUTO-MCNC: ABNORMAL MG/DL
LEUKOCYTE ESTERASE UR QL STRIP.AUTO: NEGATIVE
LYMPHOCYTES # BLD: 3.6 K/UL (ref 1–5.1)
LYMPHOCYTES NFR BLD: 27.2 %
MCH RBC QN AUTO: 32.8 PG (ref 26–34)
MCHC RBC AUTO-ENTMCNC: 34.2 G/DL (ref 31–36)
MCV RBC AUTO: 96.1 FL (ref 80–100)
MONOCYTES # BLD: 1.1 K/UL (ref 0–1.3)
MONOCYTES NFR BLD: 8.1 %
NEUTROPHILS # BLD: 8.4 K/UL (ref 1.7–7.7)
NEUTROPHILS NFR BLD: 63.3 %
NITRITE UR QL STRIP.AUTO: NEGATIVE
PERFORMED ON: ABNORMAL
PERFORMED ON: ABNORMAL
PH UR STRIP.AUTO: 5.5 [PH] (ref 5–8)
PLATELET # BLD AUTO: 251 K/UL (ref 135–450)
PMV BLD AUTO: 7.9 FL (ref 5–10.5)
POTASSIUM SERPL-SCNC: 3.6 MMOL/L (ref 3.5–5.1)
PROCALCITONIN SERPL IA-MCNC: 0.03 NG/ML (ref 0–0.15)
PROT UR STRIP.AUTO-MCNC: NEGATIVE MG/DL
RBC # BLD AUTO: 4.09 M/UL (ref 4–5.2)
RBC #/AREA URNS HPF: NORMAL /HPF (ref 0–4)
SODIUM SERPL-SCNC: 138 MMOL/L (ref 136–145)
SP GR UR STRIP.AUTO: 1.01 (ref 1–1.03)
UA COMPLETE W REFLEX CULTURE PNL UR: ABNORMAL
UA DIPSTICK W REFLEX MICRO PNL UR: ABNORMAL
URN SPEC COLLECT METH UR: ABNORMAL
URN SPEC COLLECT METH UR: NORMAL
UROBILINOGEN UR STRIP-ACNC: 0.2 E.U./DL
VIT B12 SERPL-MCNC: 887 PG/ML (ref 211–911)
WBC # BLD AUTO: 13.3 K/UL (ref 4–11)
WBC #/AREA URNS HPF: NORMAL /HPF (ref 0–5)

## 2024-11-29 PROCEDURE — 6370000000 HC RX 637 (ALT 250 FOR IP): Performed by: NURSE PRACTITIONER

## 2024-11-29 PROCEDURE — 2580000003 HC RX 258: Performed by: STUDENT IN AN ORGANIZED HEALTH CARE EDUCATION/TRAINING PROGRAM

## 2024-11-29 PROCEDURE — 6360000002 HC RX W HCPCS: Performed by: STUDENT IN AN ORGANIZED HEALTH CARE EDUCATION/TRAINING PROGRAM

## 2024-11-29 PROCEDURE — 84145 PROCALCITONIN (PCT): CPT

## 2024-11-29 PROCEDURE — 80048 BASIC METABOLIC PNL TOTAL CA: CPT

## 2024-11-29 PROCEDURE — 82746 ASSAY OF FOLIC ACID SERUM: CPT

## 2024-11-29 PROCEDURE — 99232 SBSQ HOSP IP/OBS MODERATE 35: CPT | Performed by: NURSE PRACTITIONER

## 2024-11-29 PROCEDURE — 86701 HIV-1ANTIBODY: CPT

## 2024-11-29 PROCEDURE — 83921 ORGANIC ACID SINGLE QUANT: CPT

## 2024-11-29 PROCEDURE — 6370000000 HC RX 637 (ALT 250 FOR IP): Performed by: INTERNAL MEDICINE

## 2024-11-29 PROCEDURE — 84425 ASSAY OF VITAMIN B-1: CPT

## 2024-11-29 PROCEDURE — 6360000002 HC RX W HCPCS: Performed by: INTERNAL MEDICINE

## 2024-11-29 PROCEDURE — 86780 TREPONEMA PALLIDUM: CPT

## 2024-11-29 PROCEDURE — 36415 COLL VENOUS BLD VENIPUNCTURE: CPT

## 2024-11-29 PROCEDURE — 85025 COMPLETE CBC W/AUTO DIFF WBC: CPT

## 2024-11-29 PROCEDURE — 86702 HIV-2 ANTIBODY: CPT

## 2024-11-29 PROCEDURE — 2060000000 HC ICU INTERMEDIATE R&B

## 2024-11-29 PROCEDURE — 87390 HIV-1 AG IA: CPT

## 2024-11-29 PROCEDURE — 82306 VITAMIN D 25 HYDROXY: CPT

## 2024-11-29 PROCEDURE — 2580000003 HC RX 258: Performed by: NURSE PRACTITIONER

## 2024-11-29 PROCEDURE — 99223 1ST HOSP IP/OBS HIGH 75: CPT | Performed by: STUDENT IN AN ORGANIZED HEALTH CARE EDUCATION/TRAINING PROGRAM

## 2024-11-29 PROCEDURE — 99223 1ST HOSP IP/OBS HIGH 75: CPT | Performed by: PSYCHIATRY & NEUROLOGY

## 2024-11-29 PROCEDURE — 81003 URINALYSIS AUTO W/O SCOPE: CPT

## 2024-11-29 PROCEDURE — 82607 VITAMIN B-12: CPT

## 2024-11-29 RX ORDER — UBIDECARENONE 75 MG
50 CAPSULE ORAL DAILY
Status: DISCONTINUED | OUTPATIENT
Start: 2024-11-29 | End: 2024-11-30 | Stop reason: HOSPADM

## 2024-11-29 RX ORDER — ENOXAPARIN SODIUM 100 MG/ML
40 INJECTION SUBCUTANEOUS DAILY
Status: DISCONTINUED | OUTPATIENT
Start: 2024-11-30 | End: 2024-11-30 | Stop reason: HOSPADM

## 2024-11-29 RX ORDER — BACLOFEN 10 MG/1
10 TABLET ORAL 2 TIMES DAILY PRN
Status: DISCONTINUED | OUTPATIENT
Start: 2024-11-29 | End: 2024-11-30 | Stop reason: HOSPADM

## 2024-11-29 RX ORDER — LANOLIN ALCOHOL/MO/W.PET/CERES
100 CREAM (GRAM) TOPICAL DAILY
Status: DISCONTINUED | OUTPATIENT
Start: 2024-11-29 | End: 2024-11-30 | Stop reason: HOSPADM

## 2024-11-29 RX ORDER — VENLAFAXINE HYDROCHLORIDE 75 MG/1
75 CAPSULE, EXTENDED RELEASE ORAL DAILY
Status: DISCONTINUED | OUTPATIENT
Start: 2024-11-29 | End: 2024-11-30 | Stop reason: HOSPADM

## 2024-11-29 RX ORDER — NICOTINE 21 MG/24HR
1 PATCH, TRANSDERMAL 24 HOURS TRANSDERMAL DAILY
Status: DISCONTINUED | OUTPATIENT
Start: 2024-11-29 | End: 2024-11-30 | Stop reason: HOSPADM

## 2024-11-29 RX ORDER — ATOMOXETINE 25 MG/1
25 CAPSULE ORAL DAILY
Status: DISCONTINUED | OUTPATIENT
Start: 2024-11-29 | End: 2024-11-30 | Stop reason: HOSPADM

## 2024-11-29 RX ORDER — DEXTROSE MONOHYDRATE 100 MG/ML
INJECTION, SOLUTION INTRAVENOUS CONTINUOUS PRN
Status: DISCONTINUED | OUTPATIENT
Start: 2024-11-29 | End: 2024-11-30 | Stop reason: HOSPADM

## 2024-11-29 RX ORDER — BUSPIRONE HYDROCHLORIDE 7.5 MG/1
7.5 TABLET ORAL 3 TIMES DAILY
Status: DISCONTINUED | OUTPATIENT
Start: 2024-11-29 | End: 2024-11-30 | Stop reason: HOSPADM

## 2024-11-29 RX ORDER — CARVEDILOL 6.25 MG/1
6.25 TABLET ORAL 2 TIMES DAILY WITH MEALS
Status: DISCONTINUED | OUTPATIENT
Start: 2024-11-29 | End: 2024-11-30 | Stop reason: HOSPADM

## 2024-11-29 RX ORDER — LORAZEPAM 2 MG/ML
0.5 INJECTION INTRAMUSCULAR
Status: DISCONTINUED | OUTPATIENT
Start: 2024-11-29 | End: 2024-11-30 | Stop reason: HOSPADM

## 2024-11-29 RX ORDER — SODIUM CHLORIDE 9 MG/ML
INJECTION, SOLUTION INTRAVENOUS CONTINUOUS
Status: DISCONTINUED | OUTPATIENT
Start: 2024-11-29 | End: 2024-11-30 | Stop reason: HOSPADM

## 2024-11-29 RX ORDER — ROSUVASTATIN CALCIUM 10 MG/1
40 TABLET, COATED ORAL NIGHTLY
Status: DISCONTINUED | OUTPATIENT
Start: 2024-11-29 | End: 2024-11-30 | Stop reason: HOSPADM

## 2024-11-29 RX ORDER — EZETIMIBE 10 MG/1
10 TABLET ORAL DAILY
Status: DISCONTINUED | OUTPATIENT
Start: 2024-11-29 | End: 2024-11-30 | Stop reason: HOSPADM

## 2024-11-29 RX ORDER — THIAMINE HYDROCHLORIDE 100 MG/ML
500 INJECTION, SOLUTION INTRAMUSCULAR; INTRAVENOUS 3 TIMES DAILY
Status: DISCONTINUED | OUTPATIENT
Start: 2024-11-29 | End: 2024-11-29 | Stop reason: SDUPTHER

## 2024-11-29 RX ORDER — ROPINIROLE 0.25 MG/1
0.25 TABLET, FILM COATED ORAL EVERY EVENING
Status: DISCONTINUED | OUTPATIENT
Start: 2024-11-29 | End: 2024-11-30 | Stop reason: HOSPADM

## 2024-11-29 RX ORDER — GLUCAGON 1 MG/ML
1 KIT INJECTION PRN
Status: DISCONTINUED | OUTPATIENT
Start: 2024-11-29 | End: 2024-11-30 | Stop reason: HOSPADM

## 2024-11-29 RX ADMIN — HYDROXYZINE HYDROCHLORIDE 50 MG: 50 TABLET, FILM COATED ORAL at 09:25

## 2024-11-29 RX ADMIN — BUPRENORPHINE HYDROCHLORIDE AND NALOXONE HYDROCHLORIDE DIHYDRATE 3 TABLET: 8; 2 TABLET SUBLINGUAL at 09:23

## 2024-11-29 RX ADMIN — ENOXAPARIN SODIUM 30 MG: 100 INJECTION SUBCUTANEOUS at 09:24

## 2024-11-29 RX ADMIN — SODIUM CHLORIDE: 9 INJECTION, SOLUTION INTRAVENOUS at 09:32

## 2024-11-29 RX ADMIN — LORAZEPAM 2 MG: 2 INJECTION INTRAMUSCULAR; INTRAVENOUS at 21:17

## 2024-11-29 RX ADMIN — LORAZEPAM 2 MG: 2 INJECTION INTRAMUSCULAR; INTRAVENOUS at 02:42

## 2024-11-29 RX ADMIN — CARVEDILOL 6.25 MG: 6.25 TABLET, FILM COATED ORAL at 09:24

## 2024-11-29 RX ADMIN — HYDROXYZINE HYDROCHLORIDE 50 MG: 50 TABLET, FILM COATED ORAL at 18:46

## 2024-11-29 RX ADMIN — OLANZAPINE 2.5 MG: 10 INJECTION, POWDER, LYOPHILIZED, FOR SOLUTION INTRAMUSCULAR at 03:49

## 2024-11-29 RX ADMIN — Medication 100 MG: at 10:53

## 2024-11-29 RX ADMIN — CLOPIDOGREL BISULFATE 75 MG: 75 TABLET ORAL at 09:24

## 2024-11-29 RX ADMIN — LINACLOTIDE 145 MCG: 145 CAPSULE, GELATIN COATED ORAL at 09:24

## 2024-11-29 RX ADMIN — Medication 50 MCG: at 10:52

## 2024-11-29 RX ADMIN — TRAZODONE HYDROCHLORIDE 50 MG: 50 TABLET ORAL at 23:38

## 2024-11-29 RX ADMIN — POTASSIUM CHLORIDE AND SODIUM CHLORIDE: 450; 150 INJECTION, SOLUTION INTRAVENOUS at 05:38

## 2024-11-29 RX ADMIN — CARVEDILOL 6.25 MG: 6.25 TABLET, FILM COATED ORAL at 18:46

## 2024-11-29 RX ADMIN — LORAZEPAM 2 MG: 2 INJECTION INTRAMUSCULAR; INTRAVENOUS at 14:21

## 2024-11-29 RX ADMIN — THIAMINE HYDROCHLORIDE 500 MG: 100 INJECTION, SOLUTION INTRAMUSCULAR; INTRAVENOUS at 22:06

## 2024-11-29 RX ADMIN — EZETIMIBE 10 MG: 10 TABLET ORAL at 09:24

## 2024-11-29 RX ADMIN — ROSUVASTATIN CALCIUM 40 MG: 10 TABLET, FILM COATED ORAL at 21:18

## 2024-11-29 NOTE — PROGRESS NOTES
Progress Note    Admit Date:  11/27/2024    Admitted with AMS    Subjective:  Ms. Cavanaugh is awake and alert.  Remains confused at times.  She is alert to self and year only.  Denies any street drug use/IV drug use. Denies fever, cough, chest pain, dyspnea, abdominal pain, nausea, vomiting, or diarrhea.   +tremors stated ongoing for awhile per patient.     Objective:   Patient Vitals for the past 4 hrs:   BP Temp Temp src Pulse Resp SpO2 Weight   11/29/24 0541 -- -- -- -- -- 95 % --   11/29/24 0539 105/67 98 °F (36.7 °C) Axillary 88 15 -- --   11/29/24 0442 -- -- -- -- -- -- 52.2 kg (115 lb)          Intake/Output Summary (Last 24 hours) at 11/29/2024 0826  Last data filed at 11/29/2024 0628  Gross per 24 hour   Intake 1169.15 ml   Output 450 ml   Net 719.15 ml       Physical Exam:  Gen: No distress. Alert. +thin. Appears older than stated age, chronically ill, pleasantly confused at times female   Eyes: PERRL. No sclera icterus. No conjunctival injection.   ENT: No discharge. Pharynx clear.   Neck: No JVD.  Trachea midline. +carotid bruit- L>R  Resp: No accessory muscle use. No crackles. No wheezes. No rhonchi.   CV: Regular rate. Regular rhythm. +murmur.  No rub. No edema.   Capillary Refill: Brisk,< 3 seconds   Peripheral Pulses: +2 palpable, equal bilaterally   GI: Non-tender. Non-distended.  Normal bowel sounds.  Skin: Warm and dry. No nodule on exposed extremities. No rash on exposed extremities.   M/S: No cyanosis. No joint deformity. No clubbing.   +BUE tremors   +BUE and BLE weakness R>L  +pronator drift  Neuro: Awake. Grossly nonfocal    Psych: Oriented x 2. No anxiety or agitation.      Scheduled Meds:   sodium chloride flush  5-40 mL IntraVENous 2 times per day    enoxaparin  30 mg SubCUTAneous Daily    buprenorphine-naloxone  3 tablet SubLINGual Daily    clopidogrel  75 mg Oral Daily    linaclotide  145 mcg Oral QAM AC       Continuous Infusions:   dextrose      sodium chloride      sodium chloride    Code      Melvina Grover, APRN - CNP

## 2024-11-29 NOTE — PROGRESS NOTES
Patient on bed, calm, easy to awake. With sitter at bedside. Alert and oriented. Denies pain. On room air. Call light given, side rails up x2. Lowered the bed. Bed alarm on.

## 2024-11-29 NOTE — PROGRESS NOTES
Spoke with son, Armin, for update and for patient to talk to. She has asked to speak with him multiple times.  He claims she has had tremors but they seem to be getting worse, as with her speech, and this confusion was a sudden onset.    Spoke with Alisha at [facility].  Can only verify naloxone 24 mg last dosed on 11/27/24    Asked son about MRI screening and he states he thinks she has had MRI's in the past. The only complication he anticipates is her claustrophobia. No metal in patient's body. Patient not wearing her dentures. Jewelry includes a ring and necklace.

## 2024-11-29 NOTE — PROGRESS NOTES
Patient's on bed asleep, with telesitter. No fall. Side rails up. Bed alarm on. Hand off report to Nurse Boudreaux.

## 2024-11-29 NOTE — PROGRESS NOTES
Pharmacy contacted, patient picked up Strattera 30 day supply on 10/31 prescribed by Katie Hunt.  [facility] filled Strattera 11/27 but patient has never picked it up    Effexor was filled on 10/30 (30 day supply) and picked up on 10/31

## 2024-11-29 NOTE — PROGRESS NOTES
Patient became agitated trying to get out of bed, looking for her mother. Patient reoriented to person, place and time. Assisted to bedside commode, encourage to pee. Assisted to bed thereafter. Side rails up. Bed alarm on.

## 2024-11-29 NOTE — CARE COORDINATION
Not assessed by CM due to pt's current situation. Pt to transfer to IP [facility] when medically stable

## 2024-11-29 NOTE — CONSULTS
Psychiatry Initial Consultation    Admission Date:    2024    Reason for Consult:  altered mental status     ID: domiciled, , and employed 60yo with a history of anxiety and opioid use disorder who was brought to our ED with altered mental status.    CC: \"I was off balance. That's all I can remember.\"    HPI:   Per ER:  Per ER report family called EMS because patient was not acting like herself and talking to her father who is . Patient combative, screaming, and fighting staff. They report she has history of drug misuse but has been on treatment with suboxone. Patient was running around her home naked. She was evaluated in the ER for admission to psychiatry. However subsequently patient had a brief shaking episode and was still very confused.     This afternoon, she is behaviorally stable but remains impaired; at times more coherent than others.    She is mostly oriented, though says its 2025.     She is an inconsistent historian. At first she told he she had never been , but later said she is . She can't tell me what she did after she graduated high school.     She can't recall the circumstances leading to this hospitalization other than she felt dizzy.     She struggles to sustain conversation and maintain attention. She is able to spell WORLD but when asked to spell it backward, said \"WLORD.\"    She failed finger-to-nose test in both arms. She missed my finger altogether and couldn't correct.    I tried to get her to draw a clock face. Her first attempt looked like a tree. Her second attempt (with many promptings to write in number) resulted in a bunch of hash marks in the upper right quadrant only. She didn't seem to recognize how off it was.     She is tremulous.     Past Psychiatric History:    Previous Diagnoses: anxiety   PreviousHosp:none  OutpatientTx:  PCP   Med Trials: Lexparo and Buspar  Suicidality: none per patient and chart.     Past Medical History:  Past

## 2024-11-29 NOTE — CONSULTS
Inpatient Neurology Consult  [facility] Neurology  Anderson Stockton MD    Mayra Cavanaugh  1965    Date of Service: 2024    Referring Physician: Judy Alex DO    Reason for the consult and CC: acute psychosis and tremor/shaking    HPI:   Mayra Cavanaugh is a 59 y.o. female who  has a past medical history of Abnormal cardiac valve, Hemorrhoid, Kidney stone, MI (myocardial infarction) (HCC), Migraine, and Postoperative nausea and vomiting. Admitted for acute psychosis and tremor/shaking. Notable history of substance abuse on suboxone outpatient. Afebrile. Tachycardic to 120s and BP normal to low normal during admission. Pertinent PTA meds including atomoxetine, baclofen, suboxone, buspar, aspirin, plavix, ropinirole, effexor, crestor.     She is unable to provide a history. She simply states that she had a nervous breakdown which is why she is in this facility. She denies hallucinations. I called her son who lives out of state and is a . He reports she has a history of substance abuse (stimulants) and tends to be very active when using. Never had bizarre behavior like what led to admission. She was doing well with sobriety and family was checking urine drug tests at home and she has always tested negative for illicit drugs during surveillance. He has noticed some issues with memory over the past few months. She would lie about things that were not for her benefit. Small details as if she forgot the detail and was making up facts to fill in the memory loss. He was told by his aunt that she on the day of admission developed very bizarre behavior running around the house naked and speaking to her father who is . She was combative with EMS on arrival. She was given midazolam by EMS which would explain +UDS. Son does not know details of her home meds and was not informed by his aunt that there is evidence she is taking illicit drugs, more medications than prescribed, or stopped  coordination.    Electronically signed by Alpesh Stockton MD on 11/29/2024 at 3:45 PM

## 2024-11-29 NOTE — FLOWSHEET NOTE
11/29/24 0915   Vital Signs   Temp 98.3 °F (36.8 °C)   Temp Source Axillary   Pulse (!) 104   Heart Rate Source Monitor   Respirations 18   /78   MAP (Calculated) 90   BP Location Right upper arm   BP Method Automatic   Patient Position High fowlers   Pain Assessment   Pain Assessment None - Denies Pain   Opioid-Induced Sedation   POSS Score 1   RASS   Tomlinson Agitation Sedation Scale (RASS) 0   Oxygen Therapy   SpO2 97 %   O2 Device None (Room air)     Pt a/o. Am assessment completed see flow sheet. Pt denies any pain/ needs at this time. Call light within reach. Will continue to monitor.     Patient tearful, saying parents accusing her of not taking her suboxone and she was, looking for her parents, multiple attempts to get out of bed this AM.

## 2024-11-29 NOTE — PROGRESS NOTES
Patient is alert and oriented. Patient's has ongoing 20meq KCL in 1/2 NS 1L - informed Hospitalist if need to continue since her Labs are normal. Sugar was checked it was 51mg/dl - Gave orange juice and will recheck after 30mins. Patient had only 100ml urine output. Done bladder scanning - had 0ml. Informed Hospitalist regarding decrease urine output. Awaiting for order.

## 2024-11-30 ENCOUNTER — HOSPITAL ENCOUNTER (INPATIENT)
Age: 59
LOS: 3 days | Discharge: HOME | DRG: 092 | End: 2024-12-03
Attending: INTERNAL MEDICINE
Payer: COMMERCIAL

## 2024-11-30 ENCOUNTER — APPOINTMENT (OUTPATIENT)
Dept: CT IMAGING | Age: 59
DRG: 896 | End: 2024-11-30
Payer: COMMERCIAL

## 2024-11-30 VITALS
RESPIRATION RATE: 16 BRPM | WEIGHT: 115 LBS | SYSTOLIC BLOOD PRESSURE: 129 MMHG | HEART RATE: 98 BPM | DIASTOLIC BLOOD PRESSURE: 69 MMHG | HEIGHT: 62 IN | OXYGEN SATURATION: 97 % | TEMPERATURE: 97.4 F | BODY MASS INDEX: 21.16 KG/M2

## 2024-11-30 DIAGNOSIS — F23 ACUTE PSYCHOSIS (HCC): ICD-10-CM

## 2024-11-30 DIAGNOSIS — G93.40 ACUTE ENCEPHALOPATHY: Primary | ICD-10-CM

## 2024-11-30 PROBLEM — R56.9 SEIZURE (HCC): Status: ACTIVE | Noted: 2024-11-30

## 2024-11-30 LAB
ANION GAP SERPL CALCULATED.3IONS-SCNC: 14 MMOL/L (ref 3–16)
BACTERIA UR CULT: NORMAL
BASOPHILS # BLD: 0 K/UL (ref 0–0.2)
BASOPHILS NFR BLD: 0.5 %
BUN SERPL-MCNC: 9 MG/DL (ref 7–20)
CALCIUM SERPL-MCNC: 8.1 MG/DL (ref 8.3–10.6)
CHLORIDE SERPL-SCNC: 104 MMOL/L (ref 99–110)
CO2 SERPL-SCNC: 19 MMOL/L (ref 21–32)
CREAT SERPL-MCNC: 0.5 MG/DL (ref 0.6–1.2)
DEPRECATED RDW RBC AUTO: 13.5 % (ref 12.4–15.4)
EOSINOPHIL # BLD: 0.1 K/UL (ref 0–0.6)
EOSINOPHIL NFR BLD: 1.5 %
GFR SERPLBLD CREATININE-BSD FMLA CKD-EPI: >90 ML/MIN/{1.73_M2}
GLUCOSE SERPL-MCNC: 72 MG/DL (ref 70–99)
HCT VFR BLD AUTO: 40.9 % (ref 36–48)
HGB BLD-MCNC: 14.1 G/DL (ref 12–16)
HIV 1+2 AB+HIV1 P24 AG SERPL QL IA: NORMAL
HIV 2 AB SERPL QL IA: NORMAL
HIV1 AB SERPL QL IA: NORMAL
HIV1 P24 AG SERPL QL IA: NORMAL
LYMPHOCYTES # BLD: 1.8 K/UL (ref 1–5.1)
LYMPHOCYTES NFR BLD: 27.1 %
MAGNESIUM SERPL-MCNC: 1.75 MG/DL (ref 1.8–2.4)
MCH RBC QN AUTO: 32.9 PG (ref 26–34)
MCHC RBC AUTO-ENTMCNC: 34.4 G/DL (ref 31–36)
MCV RBC AUTO: 95.5 FL (ref 80–100)
MONOCYTES # BLD: 0.6 K/UL (ref 0–1.3)
MONOCYTES NFR BLD: 9.2 %
NEUTROPHILS # BLD: 4.1 K/UL (ref 1.7–7.7)
NEUTROPHILS NFR BLD: 61.7 %
PLATELET # BLD AUTO: 172 K/UL (ref 135–450)
PMV BLD AUTO: 7.9 FL (ref 5–10.5)
POTASSIUM SERPL-SCNC: 3.5 MMOL/L (ref 3.5–5.1)
RBC # BLD AUTO: 4.28 M/UL (ref 4–5.2)
REAGIN+T PALLIDUM IGG+IGM SERPL-IMP: NORMAL
SODIUM SERPL-SCNC: 137 MMOL/L (ref 136–145)
WBC # BLD AUTO: 6.6 K/UL (ref 4–11)

## 2024-11-30 PROCEDURE — 2580000003 HC RX 258: Performed by: INTERNAL MEDICINE

## 2024-11-30 PROCEDURE — 36415 COLL VENOUS BLD VENIPUNCTURE: CPT

## 2024-11-30 PROCEDURE — 6360000002 HC RX W HCPCS: Performed by: INTERNAL MEDICINE

## 2024-11-30 PROCEDURE — 83735 ASSAY OF MAGNESIUM: CPT

## 2024-11-30 PROCEDURE — 6370000000 HC RX 637 (ALT 250 FOR IP): Performed by: INTERNAL MEDICINE

## 2024-11-30 PROCEDURE — 99233 SBSQ HOSP IP/OBS HIGH 50: CPT | Performed by: STUDENT IN AN ORGANIZED HEALTH CARE EDUCATION/TRAINING PROGRAM

## 2024-11-30 PROCEDURE — 2580000003 HC RX 258: Performed by: STUDENT IN AN ORGANIZED HEALTH CARE EDUCATION/TRAINING PROGRAM

## 2024-11-30 PROCEDURE — 2580000003 HC RX 258: Performed by: NURSE PRACTITIONER

## 2024-11-30 PROCEDURE — 80048 BASIC METABOLIC PNL TOTAL CA: CPT

## 2024-11-30 PROCEDURE — 6370000000 HC RX 637 (ALT 250 FOR IP): Performed by: NURSE PRACTITIONER

## 2024-11-30 PROCEDURE — 6370000000 HC RX 637 (ALT 250 FOR IP)

## 2024-11-30 PROCEDURE — 99239 HOSP IP/OBS DSCHRG MGMT >30: CPT | Performed by: INTERNAL MEDICINE

## 2024-11-30 PROCEDURE — 2060000000 HC ICU INTERMEDIATE R&B

## 2024-11-30 PROCEDURE — 6360000002 HC RX W HCPCS: Performed by: STUDENT IN AN ORGANIZED HEALTH CARE EDUCATION/TRAINING PROGRAM

## 2024-11-30 PROCEDURE — C9254 INJECTION, LACOSAMIDE: HCPCS | Performed by: STUDENT IN AN ORGANIZED HEALTH CARE EDUCATION/TRAINING PROGRAM

## 2024-11-30 PROCEDURE — 70450 CT HEAD/BRAIN W/O DYE: CPT

## 2024-11-30 PROCEDURE — 85025 COMPLETE CBC W/AUTO DIFF WBC: CPT

## 2024-11-30 RX ORDER — LORAZEPAM 2 MG/ML
4 INJECTION INTRAMUSCULAR EVERY 5 MIN PRN
Status: DISCONTINUED | OUTPATIENT
Start: 2024-11-30 | End: 2024-12-01

## 2024-11-30 RX ORDER — POLYETHYLENE GLYCOL 3350 17 G/17G
17 POWDER, FOR SOLUTION ORAL DAILY PRN
Status: DISCONTINUED | OUTPATIENT
Start: 2024-11-30 | End: 2024-12-03 | Stop reason: HOSPADM

## 2024-11-30 RX ORDER — LACOSAMIDE 10 MG/ML
100 INJECTION, SOLUTION INTRAVENOUS 2 TIMES DAILY
Status: DISCONTINUED | OUTPATIENT
Start: 2024-11-30 | End: 2024-11-30 | Stop reason: HOSPADM

## 2024-11-30 RX ORDER — METHOCARBAMOL 750 MG/1
750 TABLET, FILM COATED ORAL EVERY 6 HOURS PRN
Status: DISCONTINUED | OUTPATIENT
Start: 2024-11-30 | End: 2024-11-30

## 2024-11-30 RX ORDER — THIAMINE HYDROCHLORIDE 100 MG/ML
200 INJECTION, SOLUTION INTRAMUSCULAR; INTRAVENOUS DAILY
Status: DISCONTINUED | OUTPATIENT
Start: 2024-12-01 | End: 2024-12-03 | Stop reason: HOSPADM

## 2024-11-30 RX ORDER — TRAZODONE HYDROCHLORIDE 50 MG/1
50 TABLET, FILM COATED ORAL NIGHTLY PRN
Status: DISCONTINUED | OUTPATIENT
Start: 2024-11-30 | End: 2024-12-03 | Stop reason: HOSPADM

## 2024-11-30 RX ORDER — BACLOFEN 10 MG/1
10 TABLET ORAL 2 TIMES DAILY PRN
Status: DISCONTINUED | OUTPATIENT
Start: 2024-11-30 | End: 2024-12-01

## 2024-11-30 RX ORDER — ENOXAPARIN SODIUM 100 MG/ML
40 INJECTION SUBCUTANEOUS DAILY
Status: DISCONTINUED | OUTPATIENT
Start: 2024-12-01 | End: 2024-12-03 | Stop reason: HOSPADM

## 2024-11-30 RX ORDER — SODIUM CHLORIDE 0.9 % (FLUSH) 0.9 %
5-40 SYRINGE (ML) INJECTION EVERY 12 HOURS SCHEDULED
Status: DISCONTINUED | OUTPATIENT
Start: 2024-11-30 | End: 2024-12-03 | Stop reason: HOSPADM

## 2024-11-30 RX ORDER — CLOPIDOGREL BISULFATE 75 MG/1
75 TABLET ORAL DAILY
Status: DISCONTINUED | OUTPATIENT
Start: 2024-12-01 | End: 2024-12-03 | Stop reason: HOSPADM

## 2024-11-30 RX ORDER — EZETIMIBE 10 MG/1
10 TABLET ORAL DAILY
Status: DISCONTINUED | OUTPATIENT
Start: 2024-12-01 | End: 2024-12-03 | Stop reason: HOSPADM

## 2024-11-30 RX ORDER — ASPIRIN 325 MG
325 TABLET ORAL DAILY
Status: DISCONTINUED | OUTPATIENT
Start: 2024-12-01 | End: 2024-12-03 | Stop reason: HOSPADM

## 2024-11-30 RX ORDER — LACOSAMIDE 10 MG/ML
400 INJECTION, SOLUTION INTRAVENOUS
Status: COMPLETED | OUTPATIENT
Start: 2024-11-30 | End: 2024-11-30

## 2024-11-30 RX ORDER — METOPROLOL SUCCINATE 25 MG/1
25 TABLET, EXTENDED RELEASE ORAL DAILY
Status: DISCONTINUED | OUTPATIENT
Start: 2024-12-01 | End: 2024-11-30

## 2024-11-30 RX ORDER — ROPINIROLE 0.25 MG/1
0.25 TABLET, FILM COATED ORAL EVERY EVENING
Status: DISCONTINUED | OUTPATIENT
Start: 2024-11-30 | End: 2024-12-03 | Stop reason: HOSPADM

## 2024-11-30 RX ORDER — GUAIFENESIN 600 MG/1
600 TABLET, EXTENDED RELEASE ORAL 2 TIMES DAILY PRN
Status: DISCONTINUED | OUTPATIENT
Start: 2024-11-30 | End: 2024-12-03 | Stop reason: HOSPADM

## 2024-11-30 RX ORDER — VENLAFAXINE HYDROCHLORIDE 75 MG/1
75 CAPSULE, EXTENDED RELEASE ORAL DAILY
Status: DISCONTINUED | OUTPATIENT
Start: 2024-12-01 | End: 2024-12-03 | Stop reason: HOSPADM

## 2024-11-30 RX ORDER — ONDANSETRON 4 MG/1
4 TABLET, ORALLY DISINTEGRATING ORAL EVERY 8 HOURS PRN
Status: DISCONTINUED | OUTPATIENT
Start: 2024-11-30 | End: 2024-12-03 | Stop reason: HOSPADM

## 2024-11-30 RX ORDER — HYDROXYZINE PAMOATE 25 MG/1
50 CAPSULE ORAL EVERY 8 HOURS PRN
Status: DISCONTINUED | OUTPATIENT
Start: 2024-11-30 | End: 2024-12-03 | Stop reason: HOSPADM

## 2024-11-30 RX ORDER — ROSUVASTATIN CALCIUM 20 MG/1
40 TABLET, COATED ORAL NIGHTLY
Status: DISCONTINUED | OUTPATIENT
Start: 2024-11-30 | End: 2024-12-03 | Stop reason: HOSPADM

## 2024-11-30 RX ORDER — BACLOFEN 10 MG/1
10 TABLET ORAL 2 TIMES DAILY
Status: DISCONTINUED | OUTPATIENT
Start: 2024-11-30 | End: 2024-11-30 | Stop reason: HOSPADM

## 2024-11-30 RX ORDER — SODIUM CHLORIDE 9 MG/ML
INJECTION, SOLUTION INTRAVENOUS PRN
Status: DISCONTINUED | OUTPATIENT
Start: 2024-11-30 | End: 2024-12-03 | Stop reason: HOSPADM

## 2024-11-30 RX ORDER — CARVEDILOL 6.25 MG/1
6.25 TABLET ORAL 2 TIMES DAILY WITH MEALS
Status: DISCONTINUED | OUTPATIENT
Start: 2024-12-01 | End: 2024-12-03 | Stop reason: HOSPADM

## 2024-11-30 RX ORDER — NICOTINE 21 MG/24HR
1 PATCH, TRANSDERMAL 24 HOURS TRANSDERMAL DAILY
Status: DISCONTINUED | OUTPATIENT
Start: 2024-12-01 | End: 2024-12-03 | Stop reason: HOSPADM

## 2024-11-30 RX ORDER — TRAZODONE HYDROCHLORIDE 50 MG/1
50 TABLET, FILM COATED ORAL NIGHTLY PRN
Status: DISCONTINUED | OUTPATIENT
Start: 2024-11-30 | End: 2024-11-30

## 2024-11-30 RX ORDER — SODIUM CHLORIDE 0.9 % (FLUSH) 0.9 %
5-40 SYRINGE (ML) INJECTION PRN
Status: DISCONTINUED | OUTPATIENT
Start: 2024-11-30 | End: 2024-12-03 | Stop reason: HOSPADM

## 2024-11-30 RX ORDER — BUSPIRONE HYDROCHLORIDE 5 MG/1
7.5 TABLET ORAL 3 TIMES DAILY
Status: DISCONTINUED | OUTPATIENT
Start: 2024-11-30 | End: 2024-12-03 | Stop reason: HOSPADM

## 2024-11-30 RX ORDER — ATOMOXETINE 25 MG/1
25 CAPSULE ORAL DAILY
Status: DISCONTINUED | OUTPATIENT
Start: 2024-12-01 | End: 2024-12-03 | Stop reason: HOSPADM

## 2024-11-30 RX ORDER — ACETAMINOPHEN 650 MG/1
650 SUPPOSITORY RECTAL EVERY 6 HOURS PRN
Status: DISCONTINUED | OUTPATIENT
Start: 2024-11-30 | End: 2024-12-03 | Stop reason: HOSPADM

## 2024-11-30 RX ORDER — ONDANSETRON 2 MG/ML
4 INJECTION INTRAMUSCULAR; INTRAVENOUS EVERY 6 HOURS PRN
Status: DISCONTINUED | OUTPATIENT
Start: 2024-11-30 | End: 2024-12-03 | Stop reason: HOSPADM

## 2024-11-30 RX ORDER — BUPRENORPHINE HYDROCHLORIDE AND NALOXONE HYDROCHLORIDE DIHYDRATE 2; .5 MG/1; MG/1
2 TABLET SUBLINGUAL PRN
Status: DISCONTINUED | OUTPATIENT
Start: 2024-11-30 | End: 2024-12-02

## 2024-11-30 RX ORDER — ACETAMINOPHEN 325 MG/1
650 TABLET ORAL EVERY 6 HOURS PRN
Status: DISCONTINUED | OUTPATIENT
Start: 2024-11-30 | End: 2024-12-03 | Stop reason: HOSPADM

## 2024-11-30 RX ADMIN — THIAMINE HYDROCHLORIDE 500 MG: 100 INJECTION, SOLUTION INTRAMUSCULAR; INTRAVENOUS at 15:57

## 2024-11-30 RX ADMIN — ENOXAPARIN SODIUM 40 MG: 100 INJECTION SUBCUTANEOUS at 09:20

## 2024-11-30 RX ADMIN — OLANZAPINE 2.5 MG: 10 INJECTION, POWDER, LYOPHILIZED, FOR SOLUTION INTRAMUSCULAR at 02:17

## 2024-11-30 RX ADMIN — BUPRENORPHINE HYDROCHLORIDE AND NALOXONE HYDROCHLORIDE DIHYDRATE 3 TABLET: 8; 2 TABLET SUBLINGUAL at 08:02

## 2024-11-30 RX ADMIN — ROSUVASTATIN CALCIUM 40 MG: 20 TABLET, FILM COATED ORAL at 23:25

## 2024-11-30 RX ADMIN — EZETIMIBE 10 MG: 10 TABLET ORAL at 09:19

## 2024-11-30 RX ADMIN — HYDROXYZINE PAMOATE 50 MG: 25 CAPSULE ORAL at 23:25

## 2024-11-30 RX ADMIN — SODIUM CHLORIDE: 9 INJECTION, SOLUTION INTRAVENOUS at 00:37

## 2024-11-30 RX ADMIN — LACOSAMIDE 400 MG: 10 INJECTION INTRAVENOUS at 15:18

## 2024-11-30 RX ADMIN — Medication 50 MCG: at 09:19

## 2024-11-30 RX ADMIN — LINACLOTIDE 145 MCG: 145 CAPSULE, GELATIN COATED ORAL at 07:58

## 2024-11-30 RX ADMIN — Medication 10 ML: at 15:42

## 2024-11-30 RX ADMIN — CLOPIDOGREL BISULFATE 75 MG: 75 TABLET ORAL at 09:20

## 2024-11-30 RX ADMIN — TRAZODONE HYDROCHLORIDE 50 MG: 50 TABLET ORAL at 23:24

## 2024-11-30 RX ADMIN — HYDROXYZINE HYDROCHLORIDE 50 MG: 50 TABLET, FILM COATED ORAL at 09:20

## 2024-11-30 RX ADMIN — LORAZEPAM 2 MG: 2 INJECTION INTRAMUSCULAR; INTRAVENOUS at 04:34

## 2024-11-30 RX ADMIN — CARVEDILOL 6.25 MG: 6.25 TABLET, FILM COATED ORAL at 08:01

## 2024-11-30 RX ADMIN — THIAMINE HYDROCHLORIDE 500 MG: 100 INJECTION, SOLUTION INTRAMUSCULAR; INTRAVENOUS at 09:35

## 2024-11-30 RX ADMIN — SODIUM CHLORIDE: 9 INJECTION, SOLUTION INTRAVENOUS at 15:17

## 2024-11-30 NOTE — PROGRESS NOTES
Handoff report and transfer of care given at bedside to keyla .  Patient in stable condition, denies needs/concerns at this time.  Call light within reach.

## 2024-11-30 NOTE — FLOWSHEET NOTE
11/29/24 1830   Vital Signs   Temp 98 °F (36.7 °C)   Temp Source Oral   Pulse 80   Heart Rate Source Monitor   Respirations 16   BP (!) 155/95   MAP (Calculated) 115   BP Location Right upper arm   BP Method Automatic   Patient Position High fowlers     Patient continues to get out of bed. Is redirectable.  Asking to speak with her son, Alex. PRN meds given per MAR.  Vital signs stable and in no visible distress. A/O x 2 but sleepy Denies any needs at this time.  Call light within reach.

## 2024-11-30 NOTE — PLAN OF CARE
[facility] Hospitalist Transfer accept note  Transfer center PS received  Case reviewed with ER physician  Reason for Transfer:  Mayra Cavanaugh 59 y.o. female being transferred to [facility] for cEEG.  Was initially admitted with what appeared to be a psychotic episode.  Does have a history of opiate abuse and noncompliance.  Was doing well when she started having intermittent unresponsiveness episodes concerning for seizure-like activity.  Neurology services in outside facility wanting patient to go to a facility that accommodates continuous EEG.  Neurology services at [facility] contacted and agreed for the patient to be transferred to [facility].    Patient has been accepted for transfer to [facility].   Once patient arrive please page ON CALL HOSPITALIST so patient can be seen.   If unable to reach physician on [employer] please call hospitalist phone (#[phone])     PCP: Steffany Argueta, PHILLIP - CNP     Thanks  Sunil Degroot MD  Hospitalist

## 2024-11-30 NOTE — DISCHARGE SUMMARY
Hospital Medicine Discharge Summary    Patient: Mayra Cavanaugh     Gender: female  : 1965   Age: 59 y.o.  MRN: [id number]    Admitting Physician: Judy Alex DO  Discharge Physician: Judy Alex DO    Code Status: Full Code     Admit Date: 2024   Discharge Date:  2024    Discharge Diagnoses:    Active Hospital Problems    Diagnosis Date Noted    Psychosis, unspecified psychosis type (HCC) [F29] 2024    Encephalopathy acute [G93.40] 2024    Altered mental status [R41.82] 2024    Tremors of nervous system [R25.1] 2024    B12 deficiency [E53.8] 2024    Vitamin D deficiency [E55.9] 2024    Nonrheumatic aortic valve stenosis [I35.0] 2018    Restless legs syndrome (RLS) [G25.81] 2018    Mixed hyperlipidemia [E78.2] 2017    Presence of drug coated stent in left circumflex coronary artery [Z95.5] 2017       Condition at Discharge: Stable discharge to [facility] for possible seizures.    Hospital Course:     Mayra Cavanaugh is a 59 y.o. female with past medical history as below, including CAD, substance abuse on suboxone, who presents with altered mental status. Per ER report family called EMS because patient was not acting like herself and talking to her father who is . Patient combative, screaming, and fighting staff. They report she has history of drug misuse but has been on treatment with suboxone. Patient was running around her home naked. She was evaluated in the ER for admission to psychiatry. However subsequently patient had a brief shaking episode and was still very confused. While not definitely seizure activity the patient was felt not safe to go to psychiatry due to possible seizure activity.     On further history patient's family return to hospital they said they search for her medications at home and her suboxone bottle has too many pills in it.     Patient seen. She sits upright suddenly in bed appearing very anxious.

## 2024-11-30 NOTE — PROGRESS NOTES
Pt attempted to get OOB multiple frantic about grandson cardiac condition and wanting to leave. Pt was agitated PRN Ativan given. Care transferred to Sivakumar CASON

## 2024-11-30 NOTE — FLOWSHEET NOTE
Pt A/Ox2, disorientated to time and situation; re-oriented patient. Obtained VS, see below. Denies pain. Pt unlabored; respirations even, regular, effortless. RA. No distress noted. Shift assessment complete. See flowsheet. AM med's given. PRN hydroxyzine given for anxiety. See MAR. Denies needs at this time. Telemetry in place. Bed alarm on. VSC remains in place. Side rails 2/4. Bed in low position. Call light within reach.     Bedside Mobility Assessment Tool (BMAT):     Assessment Level 1- Sit and Shake    1. From a semi-reclined position, ask patient to sit up and rotate to a seated position at the side of the bed. Can use the bedrail.    2. Ask patient to reach out and grab your hand and shake making sure patient reaches across his/her midline.   Pass- Patient is able to come to a seated position, maintain core strength. Maintains seated balance while reaching across midline. Move on to Assessment Level 2.     Assessment Level 2- Stretch and Point   1. With patient in seated position at the side of the bed, have patient place both feet on the floor (or stool) with knees no higher than hips.    2. Ask patient to stretch one leg and straighten the knee, then bend the ankle/flex and point the toes. If appropriate, repeat with the other leg.   Pass- Patient is able to demonstrate appropriate quad strength on intended weight bearing limb(s). Move onto Assessment Level 3.     Assessment Level 3- Stand   1. Ask patient to elevate off the bed or chair (seated to standing) using an assistive device (cane, bedrail).    2. Patient should be able to raise buttocks off be and hold for a count of five. May repeat once.   Pass- Patient maintains standing stability for at least 5 seconds, proceed to assessment level 4.    Assessment Level 4- Walk   1. Ask patient to march in place at bedside.    2. Then ask patient to advance step and return each foot. Some medical conditions may render a patient from stepping backwards, use

## 2024-11-30 NOTE — FLOWSHEET NOTE
11/29/24 1830   Vital Signs   Temp 98 °F (36.7 °C)   Temp Source Oral   Pulse 80   Heart Rate Source Monitor   Respirations 16   BP (!) 155/95   MAP (Calculated) 115   BP Location Right upper arm   BP Method Automatic   Patient Position High fowlers     Assumed care for patient.  Handoff assessment completed. Scheduled meds given per MAR.  Vital signs stable and in no visible distress. Patient resting with eyes closed.

## 2024-11-30 NOTE — PROGRESS NOTES
Pt restless, attempting to get out of bed a few times this AM. Pt toileted via bedside commode. VSC activated x2. Redirected patient several times, pt fixated on getting out of bed.    0856: VSC alarm activated. Pt attempted to get out of bed. Re-orientated and redirected patient. Explained fall prevention and safety precautions. Pt voiced understanding, but no evidence of learning. Pt calm at this time.    1102: Pt restless and agitated at this time. Called son for the patient to speak to. Pt having conversation with son at this time.

## 2024-11-30 NOTE — FLOWSHEET NOTE
11/29/24 1830   Vital Signs   Temp 98 °F (36.7 °C)   Temp Source Oral   Pulse 80   Heart Rate Source Monitor   Respirations 16   BP (!) 155/95   MAP (Calculated) 115   BP Location Right upper arm   BP Method Automatic   Patient Position High fowlers     Pt sitting in bed confused talking about her grandson. Medications given attempted to get OOB multiple times. Telesitter in room. Call light within reach.

## 2024-11-30 NOTE — PLAN OF CARE
Problem: Discharge Planning  Goal: Discharge to home or other facility with appropriate resources  11/29/2024 2222 by Franny Guo RN  Outcome: Progressing  11/29/2024 0946 by Audra Brown RN  Outcome: Progressing     Problem: Pain  Goal: Verbalizes/displays adequate comfort level or baseline comfort level  11/29/2024 2222 by Franny Guo RN  Outcome: Progressing  11/29/2024 0946 by Audra Brown RN  Outcome: Progressing     Problem: ABCDS Injury Assessment  Goal: Absence of physical injury  11/29/2024 2222 by Franny Guo RN  Outcome: Progressing  11/29/2024 0946 by Audra Brown RN  Outcome: Progressing     Problem: Safety - Adult  Goal: Free from fall injury  11/29/2024 2222 by Franny Guo RN  Outcome: Progressing  11/29/2024 0946 by Audra Brown RN  Outcome: Progressing     Problem: Skin/Tissue Integrity  Goal: Absence of new skin breakdown  Description: 1.  Monitor for areas of redness and/or skin breakdown  2.  Assess vascular access sites hourly  3.  Every 4-6 hours minimum:  Change oxygen saturation probe site  4.  Every 4-6 hours:  If on nasal continuous positive airway pressure, respiratory therapy assess nares and determine need for appliance change or resting period.  Outcome: Progressing

## 2024-11-30 NOTE — PROGRESS NOTES
Neurology Follow-up  [facility] Neurology  Anderson Stockton MD    Date of Service: 11/30/24        Mayra Cavanaugh is a 59 y.o. female who  has a past medical history of Abnormal cardiac valve, Hemorrhoid, Kidney stone, MI (myocardial infarction) (HCC), Migraine, and Postoperative nausea and vomiting.    Subjective:   CC: Follow up today regarding: altered mental status    Events noted. Chart and lab reviewed. Psychiatry consulted who has low concern for primary psychiatric etiology of altered mental status. More suspicious of encephalopathy. She became agitated last night about grandson's cardiac condition requiring Ativan and later Ativan plus zyprexa. Received Ativan 6mg yesterday and 2mg so far this morning. Afebrile and intermittently tachycardic and hypertensive. There is a note that indicates that she is prescribed and picking up the following medications from the pharmacy: Strattera and Effexor.     Son is at bedside. He has only been here for 10 minutes or so. She has spoke to him in brief phrases saying the same question or phrase as if she forgot she just said the same thing. When I entered the room she was very drowsy which is a significant change. She did not receive a medication recently.     ROS : A 10-12 system review obtained and updated today and is unremarkable except as mentioned  in my interval history.     Medication, past medical history, social history, and family history reviewed.    Physical Examination    Mental status:   Lethargic. Nonverbal. Follows simple axial command but not appendicular commands. Does not regard examiner.      Cranial nerves:  Blink to threat absent bilateral. Corneal reflex present bilateral. VOR present bilateral crosses midline no gaze preference. Symmetric facial activation. PERRL. Tongue protrusion normal. No atrophy or fasciculations.   Normal muscle bulk. Hypotonia throughout. Purposeful withdrawal to noxious stimuli BUE and BLE.   Toes down going

## 2024-11-30 NOTE — PLAN OF CARE
Problem: Confusion  Goal: Confusion, delirium, dementia, or psychosis is improved or at baseline  Description: INTERVENTIONS:  1. Assess for possible contributors to thought disturbance, including medications, impaired vision or hearing, underlying metabolic abnormalities, dehydration, psychiatric diagnoses, and notify attending LIP  2. Sharon high risk fall precautions, as indicated  3. Provide frequent short contacts to provide reality reorientation, refocusing and direction  4. Decrease environmental stimuli, including noise as appropriate  5. Monitor and intervene to maintain adequate nutrition, hydration, elimination, sleep and activity  6. If unable to ensure safety without constant attention obtain sitter and review sitter guidelines with assigned personnel  7. Initiate Psychosocial CNS and Spiritual Care consult, as indicated  11/30/2024 1849 by Audra Underwood RN  Outcome: Not Progressing  11/30/2024 1849 by Audra Underwood RN  Outcome: Progressing     Problem: Neurosensory - Adult  Goal: Achieves stable or improved neurological status  11/30/2024 1849 by Audra Underwood RN  Outcome: Not Progressing  11/30/2024 1849 by Audra Underwood RN  Outcome: Progressing  Goal: Achieves maximal functionality and self care  11/30/2024 1849 by Audra Underwood RN  Outcome: Not Progressing  11/30/2024 1849 by Audra Underwood RN  Outcome: Progressing     Problem: Musculoskeletal - Adult  Goal: Return mobility to safest level of function  11/30/2024 1849 by Audra Underwood RN  Outcome: Not Progressing  Note: Weakness  11/30/2024 1849 by Audra Underwood RN  Outcome: Progressing  Goal: Return ADL status to a safe level of function  11/30/2024 1849 by Audra Underwood RN  Outcome: Not Progressing  11/30/2024 1849 by Audra Underwood RN  Outcome: Progressing

## 2024-11-30 NOTE — PLAN OF CARE
Problem: Discharge Planning  Goal: Discharge to home or other facility with appropriate resources  11/30/2024 0409 by Sivakumar Powers RN  Outcome: Progressing  11/29/2024 2222 by Franny Guo RN  Outcome: Progressing     Problem: Pain  Goal: Verbalizes/displays adequate comfort level or baseline comfort level  11/30/2024 0409 by Sivakumar Powers RN  Outcome: Progressing  11/29/2024 2222 by Franny Guo RN  Outcome: Progressing     Problem: ABCDS Injury Assessment  Goal: Absence of physical injury  11/30/2024 0409 by Sivakumar Powers RN  Outcome: Progressing  11/29/2024 2222 by Franny Guo RN  Outcome: Progressing     Problem: Safety - Adult  Goal: Free from fall injury  11/30/2024 0409 by Sivakumar Powers RN  Outcome: Progressing  11/29/2024 2222 by Franny Guo RN  Outcome: Progressing     Problem: Skin/Tissue Integrity  Goal: Absence of new skin breakdown  Description: 1.  Monitor for areas of redness and/or skin breakdown  2.  Assess vascular access sites hourly  3.  Every 4-6 hours minimum:  Change oxygen saturation probe site  4.  Every 4-6 hours:  If on nasal continuous positive airway pressure, respiratory therapy assess nares and determine need for appliance change or resting period.  11/30/2024 0409 by Sivakumar Powers RN  Outcome: Progressing  11/29/2024 2222 by Franny Guo RN  Outcome: Progressing

## 2024-12-01 PROBLEM — R56.9 SEIZURES (HCC): Status: ACTIVE | Noted: 2024-12-01

## 2024-12-01 PROBLEM — G93.40 ACUTE ENCEPHALOPATHY: Status: ACTIVE | Noted: 2024-12-01

## 2024-12-01 PROBLEM — F23 ACUTE PSYCHOSIS (HCC): Status: ACTIVE | Noted: 2024-12-01

## 2024-12-01 LAB
ALBUMIN SERPL-MCNC: 3.8 G/DL (ref 3.4–5)
ALBUMIN SERPL-MCNC: 3.9 G/DL (ref 3.4–5)
ANION GAP SERPL CALCULATED.3IONS-SCNC: 17 MMOL/L (ref 3–16)
ANION GAP SERPL CALCULATED.3IONS-SCNC: 18 MMOL/L (ref 3–16)
BASOPHILS # BLD: 0 K/UL (ref 0–0.2)
BASOPHILS # BLD: 0 K/UL (ref 0–0.2)
BASOPHILS NFR BLD: 0.6 %
BASOPHILS NFR BLD: 0.7 %
BUN SERPL-MCNC: 9 MG/DL (ref 7–20)
BUN SERPL-MCNC: 9 MG/DL (ref 7–20)
CALCIUM SERPL-MCNC: 9.4 MG/DL (ref 8.3–10.6)
CALCIUM SERPL-MCNC: 9.4 MG/DL (ref 8.3–10.6)
CHLORIDE SERPL-SCNC: 104 MMOL/L (ref 99–110)
CHLORIDE SERPL-SCNC: 104 MMOL/L (ref 99–110)
CO2 SERPL-SCNC: 18 MMOL/L (ref 21–32)
CO2 SERPL-SCNC: 19 MMOL/L (ref 21–32)
CREAT SERPL-MCNC: 0.7 MG/DL (ref 0.6–1.2)
CREAT SERPL-MCNC: 0.7 MG/DL (ref 0.6–1.2)
DEPRECATED RDW RBC AUTO: 13.4 % (ref 12.4–15.4)
DEPRECATED RDW RBC AUTO: 13.5 % (ref 12.4–15.4)
EOSINOPHIL # BLD: 0.1 K/UL (ref 0–0.6)
EOSINOPHIL # BLD: 0.1 K/UL (ref 0–0.6)
EOSINOPHIL NFR BLD: 1.6 %
EOSINOPHIL NFR BLD: 1.8 %
GFR SERPLBLD CREATININE-BSD FMLA CKD-EPI: >90 ML/MIN/{1.73_M2}
GFR SERPLBLD CREATININE-BSD FMLA CKD-EPI: >90 ML/MIN/{1.73_M2}
GLUCOSE BLD-MCNC: 107 MG/DL (ref 70–99)
GLUCOSE SERPL-MCNC: 59 MG/DL (ref 70–99)
GLUCOSE SERPL-MCNC: 59 MG/DL (ref 70–99)
HCT VFR BLD AUTO: 43 % (ref 36–48)
HCT VFR BLD AUTO: 44.4 % (ref 36–48)
HGB BLD-MCNC: 14.7 G/DL (ref 12–16)
HGB BLD-MCNC: 14.8 G/DL (ref 12–16)
LACTATE BLDV-SCNC: 0.9 MMOL/L (ref 0.4–2)
LYMPHOCYTES # BLD: 1.6 K/UL (ref 1–5.1)
LYMPHOCYTES # BLD: 1.8 K/UL (ref 1–5.1)
LYMPHOCYTES NFR BLD: 26.2 %
LYMPHOCYTES NFR BLD: 26.9 %
MAGNESIUM SERPL-MCNC: 1.85 MG/DL (ref 1.8–2.4)
MAGNESIUM SERPL-MCNC: 1.86 MG/DL (ref 1.8–2.4)
MCH RBC QN AUTO: 32.4 PG (ref 26–34)
MCH RBC QN AUTO: 32.6 PG (ref 26–34)
MCHC RBC AUTO-ENTMCNC: 33.3 G/DL (ref 31–36)
MCHC RBC AUTO-ENTMCNC: 34.1 G/DL (ref 31–36)
MCV RBC AUTO: 95.6 FL (ref 80–100)
MCV RBC AUTO: 97.2 FL (ref 80–100)
MONOCYTES # BLD: 0.6 K/UL (ref 0–1.3)
MONOCYTES # BLD: 0.6 K/UL (ref 0–1.3)
MONOCYTES NFR BLD: 9.2 %
MONOCYTES NFR BLD: 9.8 %
NEUTROPHILS # BLD: 3.7 K/UL (ref 1.7–7.7)
NEUTROPHILS # BLD: 4 K/UL (ref 1.7–7.7)
NEUTROPHILS NFR BLD: 61.1 %
NEUTROPHILS NFR BLD: 62.1 %
PERFORMED ON: ABNORMAL
PHOSPHATE SERPL-MCNC: 4.5 MG/DL (ref 2.5–4.9)
PHOSPHATE SERPL-MCNC: 4.6 MG/DL (ref 2.5–4.9)
PLATELET # BLD AUTO: 181 K/UL (ref 135–450)
PLATELET # BLD AUTO: 185 K/UL (ref 135–450)
PMV BLD AUTO: 7.8 FL (ref 5–10.5)
PMV BLD AUTO: 8.1 FL (ref 5–10.5)
POTASSIUM SERPL-SCNC: 3.8 MMOL/L (ref 3.5–5.1)
POTASSIUM SERPL-SCNC: 3.9 MMOL/L (ref 3.5–5.1)
RBC # BLD AUTO: 4.5 M/UL (ref 4–5.2)
RBC # BLD AUTO: 4.57 M/UL (ref 4–5.2)
SODIUM SERPL-SCNC: 140 MMOL/L (ref 136–145)
SODIUM SERPL-SCNC: 140 MMOL/L (ref 136–145)
WBC # BLD AUTO: 6 K/UL (ref 4–11)
WBC # BLD AUTO: 6.6 K/UL (ref 4–11)

## 2024-12-01 PROCEDURE — 85025 COMPLETE CBC W/AUTO DIFF WBC: CPT

## 2024-12-01 PROCEDURE — 2060000000 HC ICU INTERMEDIATE R&B

## 2024-12-01 PROCEDURE — 6360000002 HC RX W HCPCS

## 2024-12-01 PROCEDURE — 80069 RENAL FUNCTION PANEL: CPT

## 2024-12-01 PROCEDURE — 99223 1ST HOSP IP/OBS HIGH 75: CPT | Performed by: PSYCHIATRY & NEUROLOGY

## 2024-12-01 PROCEDURE — 83735 ASSAY OF MAGNESIUM: CPT

## 2024-12-01 PROCEDURE — 36415 COLL VENOUS BLD VENIPUNCTURE: CPT

## 2024-12-01 PROCEDURE — 99221 1ST HOSP IP/OBS SF/LOW 40: CPT | Performed by: NURSE PRACTITIONER

## 2024-12-01 PROCEDURE — 83605 ASSAY OF LACTIC ACID: CPT

## 2024-12-01 PROCEDURE — 6370000000 HC RX 637 (ALT 250 FOR IP)

## 2024-12-01 PROCEDURE — 2580000003 HC RX 258

## 2024-12-01 PROCEDURE — 82010 KETONE BODYS QUAN: CPT

## 2024-12-01 RX ORDER — MUPIROCIN 20 MG/G
OINTMENT TOPICAL 3 TIMES DAILY
COMMUNITY

## 2024-12-01 RX ORDER — LORAZEPAM 2 MG/ML
1 INJECTION INTRAMUSCULAR EVERY 4 HOURS PRN
Status: DISCONTINUED | OUTPATIENT
Start: 2024-12-01 | End: 2024-12-01

## 2024-12-01 RX ORDER — DEXTROSE MONOHYDRATE AND SODIUM CHLORIDE 5; .45 G/100ML; G/100ML
INJECTION, SOLUTION INTRAVENOUS CONTINUOUS
Status: ACTIVE | OUTPATIENT
Start: 2024-12-01 | End: 2024-12-02

## 2024-12-01 RX ORDER — HALOPERIDOL 5 MG/ML
2 INJECTION INTRAMUSCULAR EVERY 4 HOURS PRN
Status: DISCONTINUED | OUTPATIENT
Start: 2024-12-01 | End: 2024-12-03 | Stop reason: HOSPADM

## 2024-12-01 RX ORDER — BACLOFEN 10 MG/1
10 TABLET ORAL 2 TIMES DAILY
Status: DISCONTINUED | OUTPATIENT
Start: 2024-12-01 | End: 2024-12-03 | Stop reason: HOSPADM

## 2024-12-01 RX ORDER — BACLOFEN 10 MG/1
10 TABLET ORAL 2 TIMES DAILY
Status: DISCONTINUED | OUTPATIENT
Start: 2024-12-01 | End: 2024-12-01

## 2024-12-01 RX ADMIN — HALOPERIDOL LACTATE 2 MG: 5 INJECTION, SOLUTION INTRAMUSCULAR at 04:54

## 2024-12-01 RX ADMIN — BACLOFEN 10 MG: 10 TABLET ORAL at 01:12

## 2024-12-01 RX ADMIN — SODIUM CHLORIDE, PRESERVATIVE FREE 10 ML: 5 INJECTION INTRAVENOUS at 01:08

## 2024-12-01 RX ADMIN — BACLOFEN 10 MG: 10 TABLET ORAL at 12:55

## 2024-12-01 RX ADMIN — ONDANSETRON 4 MG: 2 INJECTION INTRAMUSCULAR; INTRAVENOUS at 22:04

## 2024-12-01 RX ADMIN — DEXTROSE AND SODIUM CHLORIDE: 5; 450 INJECTION, SOLUTION INTRAVENOUS at 12:54

## 2024-12-01 RX ADMIN — BACLOFEN 10 MG: 10 TABLET ORAL at 20:55

## 2024-12-01 RX ADMIN — WATER 5 MG: 1 INJECTION INTRAMUSCULAR; INTRAVENOUS; SUBCUTANEOUS at 00:38

## 2024-12-01 RX ADMIN — LORAZEPAM 1 MG: 2 INJECTION INTRAMUSCULAR; INTRAVENOUS at 01:52

## 2024-12-01 RX ADMIN — BUPRENORPHINE HYDROCHLORIDE AND NALOXONE HYDROCHLORIDE DIHYDRATE 2 TABLET: 2; .5 TABLET SUBLINGUAL at 01:05

## 2024-12-01 RX ADMIN — LORAZEPAM 1 MG: 2 INJECTION INTRAMUSCULAR; INTRAVENOUS at 05:59

## 2024-12-01 RX ADMIN — ROSUVASTATIN CALCIUM 40 MG: 20 TABLET, FILM COATED ORAL at 20:54

## 2024-12-01 ASSESSMENT — PAIN DESCRIPTION - ORIENTATION: ORIENTATION: POSTERIOR

## 2024-12-01 ASSESSMENT — PAIN DESCRIPTION - DESCRIPTORS: DESCRIPTORS: ACHING

## 2024-12-01 ASSESSMENT — PAIN SCALES - GENERAL: PAINLEVEL_OUTOF10: 3

## 2024-12-01 ASSESSMENT — PAIN DESCRIPTION - LOCATION: LOCATION: BACK

## 2024-12-01 NOTE — PROGRESS NOTES
Transfer center called. Transfer to [facility]. Room 1570. [facility] ETA 1945. Report number [phone].

## 2024-12-01 NOTE — PROGRESS NOTES
Called J. Spoke to KAMLA Anderson. Attempted to give phone report, but RN unavailable at that time. Provided unit number for call back.

## 2024-12-01 NOTE — PROGRESS NOTES
Patient transferred to [facility] by [facility]. Patient was alert but drowsy at time of transfer. Family aware and report called by previous nurse. Telemonitor taken off and returned. Iv site intact with fluids ns @ 75ml/h at time of transfer. All belongings with the patent.

## 2024-12-01 NOTE — CONSULTS
Clinical Pharmacy Consult Note  Medication History     Admit Date: 11/30/2024    Pharmacy consulted to verify home medication list by Dr. Regino Crocker MD.    List of current medications patient is taking is complete. Home Medication list in EPIC updated to reflect changes noted below.    Source of information: Patient's son, dispensing pharmacy, dispense history     Patient's home pharmacy: [facility]/pharmacy #[id number] [address] -  [phone] -  [phone]      Changes made to medication list:     Medications removed:   Naproxen 250 mg tablet - patient not taking per her son and dispensing pharmacy   Ondansetron 4 mg tablet - patient not taking per her son and dispensing pharmacy   Metoprolol succinate 25 mg tablet - patient not taking per her son and dispensing pharmacy     Medications added:   Mupirocin 2% ointment     Medication doses adjusted:   Famotidine 20 mg tablet - dose changed from 10 to 20 mg per dispensing pharmacy   Guaifenesin 600 mg tablet - dose changed from 1 tablet by mouth twice daily as needed to 2 tablets by mouth twice daily as needed per dispensing pharmacy and patient's son  Ropinirole 2 mg tablet - dose changed from 0.25 mg to 2 mg per dispensing pharmacy and patient's son     Other notes:   At time of med rec (12/01/2024) patient was unable to answer questions. Med list confirmed with dispensing pharmacy and patients son Alex [phone].  Last doses were filled in prior to med rec by nursing.   Baclofen 10 mg tablet - per dispense history and dispensing pharmacy last dispensed 11/25/2024 for a 30 day supply - patient's son confirmed she has the med at home   Buspirone 7.5 mg tablet - discrepancy between patient's son and dispensing pharmacy - dispensing pharmacy reports patient is filling 15 mg of buspirone, her son reports it is 7.5 mg that she has at home. Unclear where discrepancy came from. Dosing frequency is the same in both cases.   Buprenorphine-naloxone 
called and she was very belligerent with the medics and was given Versed. In the ER she was also screaming and fighting with the staff.  Head CT was ultimately able to be obtained and was unremarkable. She was admitted for further workup. At that time, there was concern for a psychiatric diagnosis and she was to be admitted to in-patient psych. However, there was apparently one brief \"shaking episode\" and so seizure then was added to the differential and so psychiatry refused to admit her.    Based on notes, it appears that patient was more calm overall into the next day, having only received 2mg of Ativan in the ER and nothing more until 11:13 the next day (11/28) when she was given Ativan 2mg once again. However, notes suggest that she was still very disoriented with some delirious behavior through that time.     Neurology was ultimately consulted and saw her for the first time on 11/29 (2d ago). Based on that note, her son reported that she has a history of substance abuse with stimulants. She has never had any behavioral issues or behavior such as this before. She has been off drugs, other then her Suboxone, for some time and apparently doing very well with that. Her son reported that he has noticed some memory issues over the last several months and it seemed that she was confabulating things at times when there was no particular personal gain for her to do so. It seemed she was confabulating things in order to fill in facts that she could not remember.    Considerations were stroke, catatonia, and Wernicke encephalopathy. Catatonia was felt less likely because she had not responded to multiple doses of Ativan she had received. Wernicke's was felt less likely because she is not known to be an alcoholic. Recommendations were for MRI brain w/o, MRA head and neck, prolonged EEG, HIV, syphilis, B1, B12, MMA, and to start thiamine.     B12 was 887, folate was slt elevated at 26.40, HIV was negative, Syphilis was

## 2024-12-01 NOTE — H&P
protocol    Chronic medical conditions:  CAD  s/p PCI and myocardial bridge 2017 and PCI x2 in 2019  -continue aspirin, crestor 40mg daily  -plavix 75mg daily  -Metoprolol 25mg daily    HLD  -Ezetimibe 10mg daily    Depression  Consider effexor and/or atomoxetine continuation, if determined to be not contributing to encephalopathy per neurology. Reported to have recently started these medications in last 3 months.      RLS  - holding Requip, resume when able      Tobacco Dependence  - nicotine patch       DVT PPx:  Lovenox  Diet:  ADULT DIET; Regular; No Added Salt (3-4 gm)   Code status:  Full Code     ELOS:  Barriers to discharge:  Disposition  - Preadmission: Kati Sumner  - Current: 5T  - Upon discharge:  TBD      Will discuss with attending physician Rakesh Peñaloza MD Rafael Fell, MD, PGY-1  Internal Medicine Resident  Contact via [employer]

## 2024-12-01 NOTE — PROGRESS NOTES
4 Eyes Skin Assessment     NAME:  Mayra Cavanaugh  YOB: 1965  MEDICAL RECORD NUMBER:  [id number]    The patient is being assessed for  Other Transfer to [facility]    I agree that at least one RN has performed a thorough Head to Toe Skin Assessment on the patient. ALL assessment sites listed below have been assessed.      Areas assessed by both nurses:    -Skin intact. No open wounds. Bruising to right forearm.     Head, Face, Ears, Shoulders, Back, Chest, Arms, Elbows, Hands, Sacrum. Buttock, Coccyx, Ischium, Legs. Feet and Heels, and Under Medical Devices         Does the Patient have a Wound? No noted wound(s)       Miky Prevention initiated by RN: No  Wound Care Orders initiated by RN: No    Pressure Injury (Stage 3,4, Unstageable, DTI, NWPT, and Complex wounds) if present, place Wound referral order by RN under : No    New Ostomies, if present place, Ostomy referral order under : No     Nurse 1 eSignature: Electronically signed by Venecia Resendez RN on 11/30/24 at 7:53 PM EST    **SHARE this note so that the co-signing nurse can place an eSignature**    Nurse 2 eSignature: Electronically signed by Aurda Underwood RN on 11/30/24 at 7:54 PM EST

## 2024-12-02 LAB
ALBUMIN SERPL-MCNC: 3.5 G/DL (ref 3.4–5)
ALBUMIN SERPL-MCNC: 4 G/DL (ref 3.4–5)
ALP SERPL-CCNC: 89 U/L (ref 40–129)
ALT SERPL-CCNC: 21 U/L (ref 10–40)
AMMONIA PLAS-SCNC: 27 UMOL/L (ref 11–51)
ANION GAP SERPL CALCULATED.3IONS-SCNC: 11 MMOL/L (ref 3–16)
AST SERPL-CCNC: 35 U/L (ref 15–37)
BASOPHILS # BLD: 0 K/UL (ref 0–0.2)
BASOPHILS NFR BLD: 0.3 %
BILIRUB DIRECT SERPL-MCNC: 0.2 MG/DL (ref 0–0.3)
BILIRUB INDIRECT SERPL-MCNC: 0.1 MG/DL (ref 0–1)
BILIRUB SERPL-MCNC: 0.3 MG/DL (ref 0–1)
BILIRUB UR QL STRIP.AUTO: NEGATIVE
BUN SERPL-MCNC: 9 MG/DL (ref 7–20)
CALCIUM SERPL-MCNC: 9.1 MG/DL (ref 8.3–10.6)
CHLORIDE SERPL-SCNC: 106 MMOL/L (ref 99–110)
CK SERPL-CCNC: 199 U/L (ref 26–192)
CLARITY UR: CLEAR
CO2 SERPL-SCNC: 24 MMOL/L (ref 21–32)
COLOR UR: YELLOW
CREAT SERPL-MCNC: 0.6 MG/DL (ref 0.6–1.2)
DEPRECATED RDW RBC AUTO: 13.5 % (ref 12.4–15.4)
EOSINOPHIL # BLD: 0.2 K/UL (ref 0–0.6)
EOSINOPHIL NFR BLD: 2.4 %
EPI CELLS #/AREA URNS HPF: NORMAL /HPF (ref 0–5)
GFR SERPLBLD CREATININE-BSD FMLA CKD-EPI: >90 ML/MIN/{1.73_M2}
GLUCOSE SERPL-MCNC: 83 MG/DL (ref 70–99)
GLUCOSE UR STRIP.AUTO-MCNC: NEGATIVE MG/DL
HCT VFR BLD AUTO: 41.7 % (ref 36–48)
HGB BLD-MCNC: 14.4 G/DL (ref 12–16)
HGB UR QL STRIP.AUTO: ABNORMAL
KETONES UR STRIP.AUTO-MCNC: NEGATIVE MG/DL
LEUKOCYTE ESTERASE UR QL STRIP.AUTO: NEGATIVE
LYMPHOCYTES # BLD: 1.7 K/UL (ref 1–5.1)
LYMPHOCYTES NFR BLD: 26.2 %
MAGNESIUM SERPL-MCNC: 1.76 MG/DL (ref 1.8–2.4)
MCH RBC QN AUTO: 32.9 PG (ref 26–34)
MCHC RBC AUTO-ENTMCNC: 34.5 G/DL (ref 31–36)
MCV RBC AUTO: 95.4 FL (ref 80–100)
METHYLMALONATE SERPL-SCNC: 0.12 UMOL/L (ref 0–0.4)
MONOCYTES # BLD: 0.6 K/UL (ref 0–1.3)
MONOCYTES NFR BLD: 8.6 %
NEUTROPHILS # BLD: 4 K/UL (ref 1.7–7.7)
NEUTROPHILS NFR BLD: 62.5 %
NITRITE UR QL STRIP.AUTO: NEGATIVE
PH UR STRIP.AUTO: 6.5 [PH] (ref 5–8)
PHOSPHATE SERPL-MCNC: 3.8 MG/DL (ref 2.5–4.9)
PLATELET # BLD AUTO: 182 K/UL (ref 135–450)
PMV BLD AUTO: 7.9 FL (ref 5–10.5)
POTASSIUM SERPL-SCNC: 3.8 MMOL/L (ref 3.5–5.1)
PROT SERPL-MCNC: 6.7 G/DL (ref 6.4–8.2)
PROT UR STRIP.AUTO-MCNC: ABNORMAL MG/DL
RBC # BLD AUTO: 4.37 M/UL (ref 4–5.2)
RBC #/AREA URNS HPF: NORMAL /HPF (ref 0–4)
SODIUM SERPL-SCNC: 141 MMOL/L (ref 136–145)
SP GR UR STRIP.AUTO: 1.02 (ref 1–1.03)
UA COMPLETE W REFLEX CULTURE PNL UR: ABNORMAL
UA DIPSTICK W REFLEX MICRO PNL UR: YES
URN SPEC COLLECT METH UR: ABNORMAL
UROBILINOGEN UR STRIP-ACNC: 1 E.U./DL
VIT B1 SERPL-MCNC: 70 NMOL/L (ref 4–15)
WBC # BLD AUTO: 6.5 K/UL (ref 4–11)
WBC #/AREA URNS HPF: NORMAL /HPF (ref 0–5)

## 2024-12-02 PROCEDURE — 93005 ELECTROCARDIOGRAM TRACING: CPT

## 2024-12-02 PROCEDURE — 6370000000 HC RX 637 (ALT 250 FOR IP)

## 2024-12-02 PROCEDURE — 85025 COMPLETE CBC W/AUTO DIFF WBC: CPT

## 2024-12-02 PROCEDURE — 83735 ASSAY OF MAGNESIUM: CPT

## 2024-12-02 PROCEDURE — 80069 RENAL FUNCTION PANEL: CPT

## 2024-12-02 PROCEDURE — 2580000003 HC RX 258

## 2024-12-02 PROCEDURE — 82140 ASSAY OF AMMONIA: CPT

## 2024-12-02 PROCEDURE — 82550 ASSAY OF CK (CPK): CPT

## 2024-12-02 PROCEDURE — 2060000000 HC ICU INTERMEDIATE R&B

## 2024-12-02 PROCEDURE — 6360000002 HC RX W HCPCS

## 2024-12-02 PROCEDURE — 81001 URINALYSIS AUTO W/SCOPE: CPT

## 2024-12-02 PROCEDURE — 36415 COLL VENOUS BLD VENIPUNCTURE: CPT

## 2024-12-02 PROCEDURE — 99232 SBSQ HOSP IP/OBS MODERATE 35: CPT

## 2024-12-02 PROCEDURE — 95819 EEG AWAKE AND ASLEEP: CPT

## 2024-12-02 PROCEDURE — 80076 HEPATIC FUNCTION PANEL: CPT

## 2024-12-02 RX ORDER — BUPRENORPHINE HYDROCHLORIDE AND NALOXONE HYDROCHLORIDE DIHYDRATE 2; .5 MG/1; MG/1
4 TABLET SUBLINGUAL 2 TIMES DAILY
Status: DISCONTINUED | OUTPATIENT
Start: 2024-12-02 | End: 2024-12-03 | Stop reason: HOSPADM

## 2024-12-02 RX ORDER — DIVALPROEX SODIUM 500 MG/1
1000 TABLET, FILM COATED, EXTENDED RELEASE ORAL NIGHTLY
Status: DISCONTINUED | OUTPATIENT
Start: 2024-12-02 | End: 2024-12-03 | Stop reason: HOSPADM

## 2024-12-02 RX ORDER — MAGNESIUM SULFATE IN WATER 40 MG/ML
2000 INJECTION, SOLUTION INTRAVENOUS ONCE
Status: COMPLETED | OUTPATIENT
Start: 2024-12-02 | End: 2024-12-02

## 2024-12-02 RX ORDER — OLANZAPINE 5 MG/1
5 TABLET ORAL DAILY
Status: DISCONTINUED | OUTPATIENT
Start: 2024-12-02 | End: 2024-12-03 | Stop reason: HOSPADM

## 2024-12-02 RX ORDER — MECOBALAMIN 5000 MCG
5 TABLET,DISINTEGRATING ORAL ONCE
Status: COMPLETED | OUTPATIENT
Start: 2024-12-03 | End: 2024-12-03

## 2024-12-02 RX ORDER — LORAZEPAM 0.5 MG/1
0.5 TABLET ORAL 2 TIMES DAILY
Status: DISCONTINUED | OUTPATIENT
Start: 2024-12-02 | End: 2024-12-03 | Stop reason: HOSPADM

## 2024-12-02 RX ORDER — LORAZEPAM 2 MG/ML
1 INJECTION INTRAMUSCULAR
Status: DISCONTINUED | OUTPATIENT
Start: 2024-12-02 | End: 2024-12-03 | Stop reason: HOSPADM

## 2024-12-02 RX ADMIN — BUPRENORPHINE HYDROCHLORIDE AND NALOXONE HYDROCHLORIDE DIHYDRATE 4 TABLET: 2; .5 TABLET SUBLINGUAL at 20:22

## 2024-12-02 RX ADMIN — THIAMINE HYDROCHLORIDE 200 MG: 100 INJECTION, SOLUTION INTRAMUSCULAR; INTRAVENOUS at 09:37

## 2024-12-02 RX ADMIN — BACLOFEN 10 MG: 10 TABLET ORAL at 09:37

## 2024-12-02 RX ADMIN — OLANZAPINE 5 MG: 5 TABLET, FILM COATED ORAL at 16:13

## 2024-12-02 RX ADMIN — DIVALPROEX SODIUM 1000 MG: 500 TABLET, EXTENDED RELEASE ORAL at 20:22

## 2024-12-02 RX ADMIN — SODIUM CHLORIDE, PRESERVATIVE FREE 10 ML: 5 INJECTION INTRAVENOUS at 09:36

## 2024-12-02 RX ADMIN — ONDANSETRON 4 MG: 2 INJECTION INTRAMUSCULAR; INTRAVENOUS at 21:58

## 2024-12-02 RX ADMIN — LORAZEPAM 0.5 MG: 0.5 TABLET ORAL at 20:23

## 2024-12-02 RX ADMIN — ACETAMINOPHEN 650 MG: 325 TABLET, FILM COATED ORAL at 10:19

## 2024-12-02 RX ADMIN — MAGNESIUM SULFATE HEPTAHYDRATE 2000 MG: 40 INJECTION, SOLUTION INTRAVENOUS at 12:07

## 2024-12-02 RX ADMIN — ROSUVASTATIN CALCIUM 40 MG: 20 TABLET, FILM COATED ORAL at 20:23

## 2024-12-02 RX ADMIN — EZETIMIBE 10 MG: 10 TABLET ORAL at 09:37

## 2024-12-02 RX ADMIN — ACETAMINOPHEN 650 MG: 325 TABLET, FILM COATED ORAL at 23:09

## 2024-12-02 ASSESSMENT — PAIN - FUNCTIONAL ASSESSMENT: PAIN_FUNCTIONAL_ASSESSMENT: ACTIVITIES ARE NOT PREVENTED

## 2024-12-02 ASSESSMENT — PAIN DESCRIPTION - ONSET: ONSET: ON-GOING

## 2024-12-02 ASSESSMENT — PAIN DESCRIPTION - FREQUENCY: FREQUENCY: CONTINUOUS

## 2024-12-02 ASSESSMENT — PAIN SCALES - GENERAL
PAINLEVEL_OUTOF10: 3
PAINLEVEL_OUTOF10: 3

## 2024-12-02 ASSESSMENT — PAIN DESCRIPTION - ORIENTATION: ORIENTATION: LEFT

## 2024-12-02 ASSESSMENT — PAIN DESCRIPTION - DESCRIPTORS
DESCRIPTORS: ACHING
DESCRIPTORS: ACHING

## 2024-12-02 ASSESSMENT — PAIN DESCRIPTION - LOCATION
LOCATION: SHOULDER
LOCATION: HEAD

## 2024-12-02 ASSESSMENT — PAIN DESCRIPTION - PAIN TYPE: TYPE: ACUTE PAIN

## 2024-12-02 NOTE — VIRTUAL HEALTH
Attempted to meet with Mayra but was unable to complete a full psychiatric assessment. She was asleep and did not awaken to voice. PCA entered room and attempted to wake her but she did not. PCA reports that this has been her presentation all morning. Last night she believed her son was outside so she was body slamming the window to try to leave. Chart review indicates she was given Zyprexa 5 mg IM at 0038.    Of note, Mayra was seen in psychiatry consult on 11/29/2024 while at [facility]. They noted that she was an inconsistent historian and had difficulty sustaining conversation and maintaining attention. The psychiatrist noted that her presentation is most consistent with delirium.     [email or web address] message sent to Dr. Hardy at 1041.    Recommendations:  Recommend Haldol 2 mg PO/IM/IV Q4H PRN. Monitor QTc if given consistently. Most recent was 448.   She is on a number of psychiatric medications (Buspar, Effexor, Strattera, Trazodone). Would hold home psychiatric medications at this time to allow time to clear and ensure they are not contributing to her presentation as they all affect serotonin.    Will ask telepsychiatry to follow throughout the week.     Mayra Cavanaugh, was evaluated through a synchronous (real-time) audio-video encounter. The patient (and/or guardian if applicable) is aware that this is a billable service, which includes applicable co-pays. This virtual visit was conducted with patient's (and/or legal guardian's) consent. Patient identification was verified, and a caregiver was present when appropriate.  The patient was located at Facility (Appt Department): [facility]T ORTHO/NEURO  [address]  [facility] [address]  Loc: [phone]  The provider was located at Home (City/State): [place], [place]  Confirm you are appropriately licensed, registered, or certified to deliver care in the state where the patient is located as indicated above. If you 
appropriate.  The patient was located at Facility (Appt Department): [facility] 5T ORTHO/NEURO  [address]  [facility] [address]  Loc: [phone]  The provider was located at Home (City/State): [place]  Confirm you are appropriately licensed, registered, or certified to deliver care in the state where the patient is located as indicated above. If you are not or unsure, please re-schedule the visit: Yes, I confirm.   [place] Consult to Tele-pysch Provider  Consult performed by: Vida Childers APRN - CNP  Consult ordered by: Joe Cardoso DO  Reason for consult: Psychosis      Total time spent on this encounter: Not billed by time    --PHILLIP Church CNP on 12/2/2024 at 9:45 AM    An electronic signature was used to authenticate this note.

## 2024-12-03 ENCOUNTER — APPOINTMENT (OUTPATIENT)
Dept: CT IMAGING | Age: 59
DRG: 092 | End: 2024-12-03
Attending: INTERNAL MEDICINE
Payer: COMMERCIAL

## 2024-12-03 VITALS
OXYGEN SATURATION: 96 % | SYSTOLIC BLOOD PRESSURE: 99 MMHG | BODY MASS INDEX: 21.16 KG/M2 | DIASTOLIC BLOOD PRESSURE: 58 MMHG | HEART RATE: 96 BPM | TEMPERATURE: 98.2 F | RESPIRATION RATE: 16 BRPM | HEIGHT: 62 IN | WEIGHT: 115 LBS

## 2024-12-03 LAB
ALBUMIN SERPL-MCNC: 3.6 G/DL (ref 3.4–5)
ANION GAP SERPL CALCULATED.3IONS-SCNC: 10 MMOL/L (ref 3–16)
BASOPHILS # BLD: 0 K/UL (ref 0–0.2)
BASOPHILS NFR BLD: 0.6 %
BUN SERPL-MCNC: 9 MG/DL (ref 7–20)
CALCIUM SERPL-MCNC: 9.2 MG/DL (ref 8.3–10.6)
CHLORIDE SERPL-SCNC: 103 MMOL/L (ref 99–110)
CO2 SERPL-SCNC: 28 MMOL/L (ref 21–32)
CREAT SERPL-MCNC: 0.7 MG/DL (ref 0.6–1.2)
DEPRECATED RDW RBC AUTO: 13.5 % (ref 12.4–15.4)
EKG ATRIAL RATE: 101 BPM
EKG DIAGNOSIS: NORMAL
EKG P AXIS: 77 DEGREES
EKG P-R INTERVAL: 144 MS
EKG Q-T INTERVAL: 352 MS
EKG QRS DURATION: 68 MS
EKG QTC CALCULATION (BAZETT): 456 MS
EKG R AXIS: 72 DEGREES
EKG T AXIS: 60 DEGREES
EKG VENTRICULAR RATE: 101 BPM
EOSINOPHIL # BLD: 0.2 K/UL (ref 0–0.6)
EOSINOPHIL NFR BLD: 3.4 %
GFR SERPLBLD CREATININE-BSD FMLA CKD-EPI: >90 ML/MIN/{1.73_M2}
GLUCOSE SERPL-MCNC: 123 MG/DL (ref 70–99)
HCT VFR BLD AUTO: 40.7 % (ref 36–48)
HGB BLD-MCNC: 14 G/DL (ref 12–16)
LYMPHOCYTES # BLD: 2.4 K/UL (ref 1–5.1)
LYMPHOCYTES NFR BLD: 40.3 %
MAGNESIUM SERPL-MCNC: 2.29 MG/DL (ref 1.8–2.4)
MCH RBC QN AUTO: 32.8 PG (ref 26–34)
MCHC RBC AUTO-ENTMCNC: 34.3 G/DL (ref 31–36)
MCV RBC AUTO: 95.5 FL (ref 80–100)
MONOCYTES # BLD: 0.7 K/UL (ref 0–1.3)
MONOCYTES NFR BLD: 11.4 %
NEUTROPHILS # BLD: 2.6 K/UL (ref 1.7–7.7)
NEUTROPHILS NFR BLD: 44.3 %
PHOSPHATE SERPL-MCNC: 3.8 MG/DL (ref 2.5–4.9)
PLATELET # BLD AUTO: 191 K/UL (ref 135–450)
PMV BLD AUTO: 8.4 FL (ref 5–10.5)
POTASSIUM SERPL-SCNC: 4.1 MMOL/L (ref 3.5–5.1)
RBC # BLD AUTO: 4.27 M/UL (ref 4–5.2)
SODIUM SERPL-SCNC: 141 MMOL/L (ref 136–145)
WBC # BLD AUTO: 5.9 K/UL (ref 4–11)

## 2024-12-03 PROCEDURE — 6360000002 HC RX W HCPCS

## 2024-12-03 PROCEDURE — 93010 ELECTROCARDIOGRAM REPORT: CPT | Performed by: INTERNAL MEDICINE

## 2024-12-03 PROCEDURE — 83735 ASSAY OF MAGNESIUM: CPT

## 2024-12-03 PROCEDURE — 6370000000 HC RX 637 (ALT 250 FOR IP)

## 2024-12-03 PROCEDURE — 97530 THERAPEUTIC ACTIVITIES: CPT

## 2024-12-03 PROCEDURE — 97116 GAIT TRAINING THERAPY: CPT

## 2024-12-03 PROCEDURE — 85025 COMPLETE CBC W/AUTO DIFF WBC: CPT

## 2024-12-03 PROCEDURE — 36415 COLL VENOUS BLD VENIPUNCTURE: CPT

## 2024-12-03 PROCEDURE — 70450 CT HEAD/BRAIN W/O DYE: CPT

## 2024-12-03 PROCEDURE — 97161 PT EVAL LOW COMPLEX 20 MIN: CPT

## 2024-12-03 PROCEDURE — 80069 RENAL FUNCTION PANEL: CPT

## 2024-12-03 PROCEDURE — 2580000003 HC RX 258

## 2024-12-03 PROCEDURE — 97165 OT EVAL LOW COMPLEX 30 MIN: CPT

## 2024-12-03 RX ORDER — LORAZEPAM 0.5 MG/1
0.5 TABLET ORAL 2 TIMES DAILY
Qty: 60 TABLET | Refills: 0 | Status: SHIPPED | OUTPATIENT
Start: 2024-12-03 | End: 2025-01-02

## 2024-12-03 RX ORDER — OLANZAPINE 5 MG/1
5 TABLET ORAL DAILY
Qty: 30 TABLET | Refills: 3 | Status: SHIPPED | OUTPATIENT
Start: 2024-12-04

## 2024-12-03 RX ORDER — DIVALPROEX SODIUM 500 MG/1
1000 TABLET, FILM COATED, EXTENDED RELEASE ORAL NIGHTLY
Qty: 30 TABLET | Refills: 3 | Status: SHIPPED | OUTPATIENT
Start: 2024-12-03

## 2024-12-03 RX ADMIN — SODIUM CHLORIDE, PRESERVATIVE FREE 10 ML: 5 INJECTION INTRAVENOUS at 09:38

## 2024-12-03 RX ADMIN — OLANZAPINE 5 MG: 5 TABLET, FILM COATED ORAL at 09:39

## 2024-12-03 RX ADMIN — THIAMINE HYDROCHLORIDE 200 MG: 100 INJECTION, SOLUTION INTRAMUSCULAR; INTRAVENOUS at 09:39

## 2024-12-03 RX ADMIN — LORAZEPAM 0.5 MG: 0.5 TABLET ORAL at 09:40

## 2024-12-03 RX ADMIN — BUPRENORPHINE HYDROCHLORIDE AND NALOXONE HYDROCHLORIDE DIHYDRATE 4 TABLET: 2; .5 TABLET SUBLINGUAL at 09:40

## 2024-12-03 RX ADMIN — Medication 5 MG: at 03:03

## 2024-12-03 RX ADMIN — EZETIMIBE 10 MG: 10 TABLET ORAL at 09:40

## 2024-12-03 RX ADMIN — ACETAMINOPHEN 650 MG: 325 TABLET, FILM COATED ORAL at 05:26

## 2024-12-03 ASSESSMENT — PAIN SCALES - GENERAL
PAINLEVEL_OUTOF10: 0
PAINLEVEL_OUTOF10: 3
PAINLEVEL_OUTOF10: 0
PAINLEVEL_OUTOF10: 0

## 2024-12-03 ASSESSMENT — PAIN DESCRIPTION - FREQUENCY: FREQUENCY: CONTINUOUS

## 2024-12-03 ASSESSMENT — PAIN DESCRIPTION - ONSET: ONSET: ON-GOING

## 2024-12-03 ASSESSMENT — PAIN - FUNCTIONAL ASSESSMENT: PAIN_FUNCTIONAL_ASSESSMENT: ACTIVITIES ARE NOT PREVENTED

## 2024-12-03 ASSESSMENT — PAIN DESCRIPTION - PAIN TYPE: TYPE: ACUTE PAIN

## 2024-12-03 ASSESSMENT — PAIN DESCRIPTION - ORIENTATION: ORIENTATION: LEFT

## 2024-12-03 ASSESSMENT — PAIN DESCRIPTION - DESCRIPTORS: DESCRIPTORS: ACHING

## 2024-12-03 ASSESSMENT — PAIN DESCRIPTION - LOCATION: LOCATION: SHOULDER

## 2024-12-03 NOTE — PLAN OF CARE
Problem: Discharge Planning  Goal: Discharge to home or other facility with appropriate resources  12/1/2024 2125 by Stefany Hawthorne, RN  Outcome: Progressing     Problem: Safety - Adult  Goal: Free from fall injury  12/1/2024 2125 by Stefany Hawthorne, RN  Outcome: Progressing  Note: Fall precautions in place. Bed alarm on, bed in lowest position, call light within reach, non slip socks, hourly rounding.      Problem: Neurosensory - Adult  Goal: Absence of seizures  Outcome: Progressing     
  Problem: Discharge Planning  Goal: Discharge to home or other facility with appropriate resources  12/2/2024 0903 by Kiesha Wynn RN  Outcome: Progressing  12/1/2024 2125 by Stefany Hawthorne RN  Outcome: Progressing     Problem: Safety - Adult  Goal: Free from fall injury  12/2/2024 0903 by Kiesha Wynn RN  Outcome: Progressing  12/1/2024 2125 by Stefany Hawthorne RN  Outcome: Progressing  Note: Fall precautions in place. Bed alarm on, bed in lowest position, call light within reach, non slip socks, hourly rounding.      Problem: Neurosensory - Adult  Goal: Achieves stable or improved neurological status  Outcome: Progressing  Goal: Absence of seizures  12/2/2024 0903 by Kiesha Wynn RN  Outcome: Progressing  12/1/2024 2125 by Stefany Hawthorne RN  Outcome: Progressing  Goal: Remains free of injury related to seizures activity  Outcome: Progressing  Goal: Achieves maximal functionality and self care  Outcome: Progressing     
  Problem: Discharge Planning  Goal: Discharge to home or other facility with appropriate resources  Outcome: Completed     Problem: Safety - Adult  Goal: Free from fall injury  12/3/2024 1237 by Kiesha Wynn RN  Outcome: Completed  12/2/2024 2239 by Keysha Simpson RN  Outcome: Progressing  Note: All fall precautions in place and call light is within reach.      Problem: Neurosensory - Adult  Goal: Achieves stable or improved neurological status  12/3/2024 1237 by Kiesha Wynn RN  Outcome: Completed  12/2/2024 2239 by Keysha Simpson RN  Outcome: Progressing  Note: Neuro checks q4, neuro exam WDL.  Goal: Absence of seizures  Outcome: Completed  Goal: Remains free of injury related to seizures activity  Outcome: Completed  Goal: Achieves maximal functionality and self care  Outcome: Completed     Problem: Nutrition Deficit:  Goal: Optimize nutritional status  Outcome: Completed     Problem: Pain  Goal: Verbalizes/displays adequate comfort level or baseline comfort level  Outcome: Completed     
  Problem: Safety - Adult  Goal: Free from fall injury  12/1/2024 0222 by Marisol Merrill RN  Outcome: Progressing   Safety measures in place - call light within reach  
  Problem: Safety - Adult  Goal: Free from fall injury  12/2/2024 2239 by Keysha Simpson, RN  Outcome: Progressing  Note: All fall precautions in place and call light is within reach.      Problem: Neurosensory - Adult  Goal: Achieves stable or improved neurological status  12/2/2024 2239 by Keysha Simpson, RN  Outcome: Progressing  Note: Neuro checks q4, neuro exam WDL.     
  Problem: Safety - Adult  Goal: Free from fall injury  Outcome: Progressing   Avasys on. Staff rounding frequently ob this pt Pt free from injury this shift and free of falls. 3/4 rails up on bed and bed is in the lowest position.Wheels locked and bed alarm set. Socks on pt and ID bands on pt. Call light in reach of pt and pt educated to call out to get up. Will continue to monitor for safety.  Seizure precautions in place with avasys on.  
 Went to see patient to complete formal consultation and at time of visit, patient was acutely agitated body slamming the window in attempts to jump out of it, as well as is having flight of ideas. Internal medicine team aware, and nursing staff and bedside sitter at bedside with the patient. Zyprexa has been ordered per IM team. [facility] not appear that patient received her 10 mg Baclofen. Will order a dose so she can receive it now. Will wait until patient is more calm to formally assess and complete consultation.     Formal consultation note to follow later this morning.     
sent here for cEEG monitoring: we will defer to Neurology: Held for now as she is feeling better.      Appears neurology at [place] was recommending further neuro imaging, which would be reasonable, but we will defer to neurology here: consulted     May also consider LP: we will defer to Neurology : Deferred for now as she is improving.      Discussed her care with Dr. Crocker     Continue baclofen [place] as cannot r/o withdrawal,  but we will consult Pharm to review her med list/medication hx to see if she was taking baclofen in the first place     Hold ativan except for seizures or if needed for procedure (like MRI). Ordered.  Follow result     Use antipsychotics for behavior control      Pxt is on full strength ASA as well as Plavix. Unable to determine an indication for this. We will  mg ASA or if ASA needed, reduce to 81 mg. If planned for any invasive procedure: Hold Plavix.            Disposition: Likely In-Pxt Psych   PT/OT eval: to guide disposition.  Possible DC tomorrow if continues to improve              Ezio Hardy MD              [facility]  [x] High (any 2 of A, B, or C)     A. Problems (any 1)  [x] Acute/Chronic Illness/injury posing threat to life or bodily function:    [] Severe exacerbation of chronic illness:    ---------------------------------------------------------------------  B. Risk of Treatment (any 1)   [] IV ABX requiring serial renal monitoring for nephrotoxicity:     [] IV Narcotic analgesia for adverse drug reaction  [] IV diuresis requiring serial monitoring for renal impairment and electrolyte derangements  [] Critical electrolyte abnormalities requiring IV replacement and close serial monitoring  [] Insulin - monitoring serial FSBS for Hypoglycemic adverse drug reaction  [] Anticoagulation requiring serial monitoring of coagulation factors  [x] IV/IM Controlled Substances order (any 1)   [] One-time Order including Drug Name/Route, Reason Ordered:   [] Scheduled Order including 
Decision to Escalate Care To:   [] Major Surgery/Procedure (any 1):   Elective with patient risk factors including Procedure Type and Risk Factors:   Emergent Procedure Type:    ----------------------------------------------------------------------  C. Data (any 2)  [] Data Review (3+ points)  [x] Consultant Note reviewed with note date, specialty, and summary (1 point each): Psychiatry  [x] All current labs were reviewed and interpreted for clinical significance   [] Studies Reviewed (1 point each):   [x] Collateral history obtained including from who and why needed (Max 1 point): Anil Johnson () provided majority of the history   [] Independent (Ezio Hardy MD) Interpretation of tests (any 1)  [] Rhythm Strip (Telemetry) personally reviewed and interpreted as documented above    [] Imaging personally reviewed and interpreted, includes:    [] Discussion (any 1)  [] Discussed the discharge plan in detail with case management  including timing/barriers to discharge, need for support services and/or placement decision   [] Discussed management of the case with:

## 2024-12-03 NOTE — PROCEDURES
PROCEDURE NOTE  Date: 12/3/2024   Name: Mayra Cavanaugh  YOB: 1965    EEG awake and asleep    Date/Time: 12/3/2024 9:01 AM    Performed by: Lauren Baldwin MD  Authorized by: Oskar Medina, PHILLIP - CLIF        CLINICAL HISTORY:  Ms. Cavanaugh is a 60yo woman with history of substance abuse on Suboxone who presented to ER with apparent abrupt psychotic break and extreme agitation and belligerence requiring multiple doses of multiple medications in order to subdue her. There was a vague description of some \"shaking,\" which raised concerns for seizures. She was then somnolent which then prompted request for cvEEG. Her head CT was normal.   She is now nearly back to her baseline with no further seizure like activity.  Clinical concern is for complex partial or simple partial seizures.    FINDINGS: This is a routine 16 channel referential and bipolar video EEG. There is a background rhythm in the alpha range, with a posterior dominant rhythm that attenuates appropriately with eye opening.  Photic stimulation is performed without driving or abnormality.  Hyperventilation is not performed.  No epileptiform abnormalities are noted. No electrographic seizures are recorded. There is no asymmetry.    IMPRESSION:  This is a normal EEG in the awake and drowsy states.  No focal or epileptiform abnormalities.

## 2024-12-03 NOTE — PROGRESS NOTES
NEUROLOGY PROGRESS NOTE       Patient Name: Mayra Cavanaugh YOB: 1965   Sex: Female Age: 59 yrs     CC / Reason for Consult: \"concern for seizure, transferred for cEEG\"     Changes over last 24 hours:   -Exam even better today, she feels back to herself  -Routine EEG normal  -Repeat CT unremarkable   -Worked with PT/OT today    ROS: Denies any acute complaints     ASSESSMENT & RECOMMENDATIONS   Assessment:  58yo woman with history of substance abuse on Suboxone who presented to ER with apparent abrupt psychosis, extreme agitation and belligerence requiring multiple doses of multiple medications to subdue her with a vague description of some \"shaking,\" which raised concerns for seizures, and then somnolence which then prompted request for cvEEG.    She is now nearly back to her baseline with no further seizure like activity. Her EEG is normal as well as head CT x2. Feel that whatever occurred was likely not related to a primary, organic neurologic process.     Plan:  -Psychiatry consulted, appreciate recommendations with medication management   -She was started on Depakote per psych  -Cleared by psych on 12/2  -No further inpatient neurologic workup warranted at this time  -We will sign off. Please do not hesitate to reach out with any questions or concerns.    Above case and plan discussed with Dr. Vicky cruz who agrees    Oskar Medina, APRN - CNP   NEUROLOGY [phone]  12/3/2024 12:49 PM  [facility] NEUROLOGY  HISTORY   Interval history:  As above    [facility] Past Medical History:   Diagnosis Date    Abnormal cardiac valve     Hemorrhoid     Kidney stone     MI (myocardial infarction) (HCC)     Migraine     Postoperative nausea and vomiting       Allergies Allergies   Allergen Reactions    Bee Venom Swelling    Sumatriptan Other (See Comments)     Jaw pain    Tramadol      Can take vicodin/percocet    Butalbital-Apap-Caffeine Nausea And Vomiting    Pcn [Penicillins] Rash    
    Internal Medicine Progress Note    Date: 12/2/2024  Patient: Mayra NEELY [facility] Day: 2      CC: Altered mental status    Interval Hx     Patient is conscious alert and oriented x 4 today.  No acute overnight events.  No new complaints.  Scheduled for MRI today.      Objective     Vital Signs:  Patient Vitals for the past 8 hrs:   BP Temp Temp src Resp SpO2   12/02/24 1056 (!) 102/58 98.7 °F (37.1 °C) Oral 16 95 %   12/02/24 0800 (!) 93/54 98.1 °F (36.7 °C) Temporal 14 93 %       Physical Exam  Constitutional:       Comments: Lethargic, no acute distress, not ill-appearing   HENT:      Head: Normocephalic and atraumatic.      Mouth/Throat:      Mouth: Mucous membranes are dry.      Pharynx: Oropharynx is clear.   Eyes:      Conjunctiva/sclera: Conjunctivae normal.      Pupils: Pupils are equal, round, and reactive to light.   Cardiovascular:      Rate and Rhythm: Normal rate and regular rhythm.      Pulses: Normal pulses.      Heart sounds: Normal heart sounds. No murmur heard.     No friction rub. No gallop.   Pulmonary:      Breath sounds: No wheezing, rhonchi or rales.   Abdominal:      General: Abdomen is flat.      Palpations: Abdomen is soft.   Musculoskeletal:      Right lower leg: No edema.      Left lower leg: No edema.   Skin:     General: Skin is warm and dry.   Neurological:      General: No focal deficit present.      Mental Status: She is alert and oriented to person, place, and time.      Cranial Nerves: No cranial nerve deficit.      Comments: Oriented to self and year only.   No focal neurological deficits but her effort with cranial nerve testing, gross motor function, and cerebellar testing is poor. She is able to display appropriate and symmetric strength after much coaxing.    Psychiatric:         Mood and Affect: Mood normal.         Behavior: Behavior normal.         Thought Content: Thought content normal.         Judgment: Judgment normal.           Intake/Output Summary (Last 
    Internal Medicine Progress Note    Date: 2024  Patient: Mayra Cavanaugh  Hospital Day: 1      CC: Altered mental status    Interval Hx   Vital signs are stable with some intermittent mild tachycardia. Upon transfer to [facility] she was severely agitated, attempting to break through her window because she thought her son was outside. She is s/p Haldol, Ativan, and baclofen when I examined her. She is unable to provide much history, oriented to her first name and year, but not place, situation. She does not recall any of the events leading up to being in the hospital.     Patient has no complaints.      HPI: \"Ms. Mayra Cavanaugh is a 59 y.o. female with a MHx significant for, CAD (FABIO to circumflex 2017, proximal/mid RCA 2019), polysubstance use disorder (on suboxone), restless leg syndrome, HLD  who presented to [facility] from [facility] on  after having altered mental status.      Son Alex () provided majority of the history. He describes his mother recently starting opioid and cocaine use several months ago with some people at work, which he has noticed in subtle personality changes from her baseline. He was able to convince her to pursue rehab and stop using, which she did and she was compliant with treatment. She progressively returned to her normal personality, described to be happy and independent with IADLs.      3 days ago she visited family in [place] and was initially normal, but overnight noted to be confused and not sleeping. She became notably more confused/agitated, running around the house naked and asking/talking to her father who  6 years ago. Per son (and his aunt), there was no clear trigger to her rapid change in cognition, reported to be normal prior and no seizure like activity/ alcohol use/ substance use.      Patient was initially brought to [facility], noted to have fluctuating levels of alertness and orientation. Ativan used to control agitation. 
  Comprehensive Nutrition Assessment    RECOMMENDATIONS:  PO Diet: Continue Regular;Safety Tray(Disposables No Utensils)  Nutrition Supplement: Begin Ensure +HP tid  Nutrition Education: No recommendation at this time     NUTRITION ASSESSMENT:   Nutritional summary & status: Positive nutrition screen for weight loss. Pt admitted with AMS, possibly toxic encephalopathy sec to substance use, or baclofen withdrawal per MD. Pt with hx of polysubstance use disorder; on suboxone; Hx of cocaine and benzo abuse. EMR showing  wt gain in past 6 months, no loss. Meal intake poor since admit at 1-25%. Unable to obtain diet/wt hx due to AMS. Nutrition status compromised due to poor po intake through admit. Will order Ensure +HP tid to promote adequate nutrition. Continue to monitor intake adequacy and tolerance to supplement.   Admission // PMH: Acute psychosis//polysubstance abuse, migraine    MALNUTRITION ASSESSMENT  Context of Malnutrition: Acute Illness   Malnutrition Status: At risk for malnutrition  Findings of the 6 clinical characteristics of malnutrition (Minimum of 2 out of 6 clinical characteristics is required to make the diagnosis of moderate or severe Protein Calorie Malnutrition based on AND/ASPEN Guidelines):  Energy Intake:  75% or less of estimated energy requirements for 7 or more days  Weight Loss:  No weight loss     Body Fat Loss:  Unable to assess (pt agitated)     Muscle Mass Loss:  Unable to assess      NUTRITION DIAGNOSIS   Inadequate oral intake related to cognitive or neurological impairment as evidenced by intake 0-25%    Nutrition Monitoring and Evaluation:   Food/Nutrient Intake Outcomes:  Food and Nutrient Intake, Enteral Nutrition Intake/Tolerance  Physical Signs/Symptoms Outcomes:  Biochemical Data, Nutrition Focused Physical Findings, Weight     OBJECTIVE DATA: Significant to nutrition assessment  Nutrition Related Findings: No documented BM. No edema. Mg 1.76.  Wounds: None  Nutrition Goals: 
4 Eyes Skin Assessment     NAME:  Mayra Cavanaugh  YOB: 1965  MEDICAL RECORD NUMBER:  [id number]    The patient is being assessed for  Admission    I agree that at least one RN has performed a thorough Head to Toe Skin Assessment on the patient. ALL assessment sites listed below have been assessed.      Areas assessed by both nurses:    Head, Face, Ears, Shoulders, Back, Chest, Arms, Elbows, Hands, Sacrum. Buttock, Coccyx, Ischium, Legs. Feet and Heels, and Under Medical Devices         Does the Patient have a Wound? No noted wound(s)     Blanchable redness to heels and bottom  Miky Prevention initiated by RN: Yes  Wound Care Orders initiated by RN: No    Pressure Injury (Stage 3,4, Unstageable, DTI, NWPT, and Complex wounds) if present, place Wound referral order by RN under : No    New Ostomies, if present place, Ostomy referral order under : No     Nurse 1 eSignature: Electronically signed by Marisol Merrill RN on 11/30/24 at 9:46 PM EST    **SHARE this note so that the co-signing nurse can place an eSignature**    Nurse 2 eSignature: {Esignature:[id number]}    
EEG completed and available for interpretation on the [facility] database .    
IV and telemetry removed. Discharge instructions gone over with patient and script information (see progress note). Patient in stable condition for discharge. [employer] picked up patient for discharge home.   
Patient alert and oriented x4. On room air. Up SBA with GB. Pills WWW. Awaiting routine EEG and CT head.  
Patient will wake up to voice, however she has been sleeping and lethargic most of this shift. Pt is Oriented to Person and Place with intermittent confusion. Pt has one episode of trying to leave the room, but was successfully redirected by Nursing staff. VSS this shift. Patient has endorsed no pain at present. Patient is tolerating PO diet, however has minimal appetite and needs encouragement.Tolerating ambulation x 1-2 assist with GB. MRI is expected to take place some time tomorrow - per Hoang carias. MRI screening form has not been completed at present. This RN called Pt's son to aid with filling out the form - with no answer. Fall and safety precautions in place, call light within reach.    
Peripheral IV (22G 1.75\") successfully placed with ultrasound guidance to right upper arm after 1 attempt. Flushed with normal saline. Blood return noted. Line capped. Patient tolerated well.    
Psych cleared patient  Sitter and q15 minute safety checks discontinued  Sitter removed from bedside  
Pt alert to person; intermittently oriented to place, time, and situation. VSS on RA. Pt tolerating PO diet, though has has minimal intake this shift. Po intake encouraged. Ambulation x-2 assist with BG. Voiding adequately via BSC. No c/o pain thus far. MRI form completed. Denies needs at this time. Fall and seizure precautions in place, call light within reach.   
Pt. Oriented x4. VSS on RA. No neuro changes overnight, no seizure activity noted overnight. All fall precautions in place and call light is within reach.   
Scripts (depakote and zyprexa) sent to wrong pharmacy.   [facility] [address] in [place], [place] contacted to hold on filling scripts.  [facility] in [place], [place], contacted by this RN and transferred to that pharmacy which is where patient normally fills her scripts.  
VSS. Pt admitted to [address] [place] [address]. Pt was lethargic upon arrival but after 30 minutes of arriving to the unit she became more alert and asked to call her son. Pt stated to her son on the phone that she wanted him to pick her up. Upon assessment of this pt she was found to be only alert to self. She became increasingly agitated and exhibited flight of ideas to this writer. Pt stated she was having a baby in the morning and screamed at staff. She then attempted to jump out of window. Pt was not harmed in this attempt and was safely returned to bed. Zyprexa per resident was ordered and given per orders. Pt continues to be verbal and state that she wants to walk home. Staff reassuring pt at bedside. NCC NP at bedside as well. No jerking movements or staring spells thus far. Plan of care continues. Bed alarm set. Call light in reach. Will continue to monitor.  Avasys and seizure pads on.  
History  Social/Functional History  Lives With: Family (sister and brother in law)  Type of Home: House  Home Layout: One level  Home Access: Level entry  Bathroom Shower/Tub: Tub/Shower unit  Bathroom Toilet: Standard  Bathroom Equipment: Shower chair, Grab bars in shower, Grab bars around toilet  Home Equipment: None  Has the patient had two or more falls in the past year or any fall with injury in the past year?: No  ADL Assistance: Independent  Homemaking Assistance: Independent  Ambulation Assistance: Independent  Transfer Assistance: Independent  Active : Yes  Occupation: Full time employment  Type of Occupation: save a lot  Leisure & Hobbies: hang out with grandson    Objective Treatment included functional transfer training, ADL's and pt. education.             Safety Devices  Type of Devices: Left in chair;Chair alarm in place;Nurse notified;Gait belt;Call light within reach  Bed Mobility Training  Bed Mobility Training: No  Balance  Sitting: Intact  Standing: Intact  Transfer Training  Transfer Training: Yes  Overall Level of Assistance: Supervision  Interventions: Verbal cues;Safety awareness training  Sit to Stand: Supervision  Stand to Sit: Supervision  Toilet Transfer: Supervision  Gait  Gait Training: Yes  Overall Level of Assistance: Stand-by assistance  Assistive Device: Gait belt  Interventions: Safety awareness training;Verbal cues     AROM: Within functional limits  PROM: Within functional limits  Strength: Within functional limits  Coordination: Within functional limits  Tone: Normal  Sensation: Intact  ADL  Grooming: Supervision  Toileting: Supervision     Activity Tolerance  Activity Tolerance: Patient tolerated evaluation without incident;Patient tolerated treatment well        Vision  Vision: Within Functional Limits  Hearing  Hearing: Within functional limits  Cognition  Overall Cognitive Status: Exceptions  Arousal/Alertness: Appears intact  Following Commands: Follows multistep 
Joe Cardoso DO  Diagnosis: Acute psychosis  Follows Commands: Within Functional Limits  Subjective  Subjective: Pt found sitting up EOB upon arrival, reporting 8/10 shoulder pain (RN aware) and agreeable to therapy.         Social/Functional History  Social/Functional History  Lives With: Family (sister and brother in law)  Type of Home: House  Home Layout: One level  Home Access: Level entry  Bathroom Shower/Tub: Tub/Shower unit  Bathroom Toilet: Standard  Bathroom Equipment: Shower chair, Grab bars in shower, Grab bars around toilet  Home Equipment: None  Has the patient had two or more falls in the past year or any fall with injury in the past year?: No  ADL Assistance: Independent  Homemaking Assistance: Independent  Ambulation Assistance: Independent  Transfer Assistance: Independent  Active : Yes  Occupation: Full time employment  Type of Occupation: save a lot  Leisure & Hobbies: hang out with grandson  Vision/Hearing  Vision  Vision: Within Functional Limits  Hearing  Hearing: Within functional limits    Cognition   Orientation  Overall Orientation Status: Within Functional Limits  Orientation Level: Oriented X4    Objective  Temp: 98.2 °F (36.8 °C)  Pulse: 96  Heart Rate Source: Monitor  Respirations: 16  SpO2: 96 %  O2 Device: None (Room air)  BP: 104/63  MAP (Calculated): 77  BP Location: Left upper arm  BP Method: Automatic  Patient Position: Semi fowlers             AROM RLE (degrees)  RLE AROM: WFL  AROM LLE (degrees)  LLE AROM : WFL  Strength RLE  Strength RLE: WFL  Strength LLE  Strength LLE: WFL           Bed mobility  Bed Mobility Comments: NT - pt sitting up at start and end of session.  Transfers  Sit to Stand: Supervision  Stand to Sit: Supervision  Ambulation  Surface: Level tile  Device: No Device  Assistance: Stand by assistance  Quality of Gait: moderate violeta, stride length and Brenda. Overall steady with no LOB or near LOB.  Distance: 400'     Balance  Posture: 
medications to account for her sleepiness, but this is inaccurate.     Since being picked up by EMS on 11/27 she has received the following (including since arriving here last night):      PMH Past Medical History:   Diagnosis Date    Abnormal cardiac valve     Hemorrhoid     Kidney stone     MI (myocardial infarction) (HCC)     Migraine     Postoperative nausea and vomiting       Allergies Allergies   Allergen Reactions    Bee Venom Swelling    Sumatriptan Other (See Comments)     Jaw pain    Tramadol      Can take vicodin/percocet    Butalbital-Apap-Caffeine Nausea And Vomiting    Pcn [Penicillins] Rash      Diet ADULT DIET; Regular; Safety Tray; Safety Tray (Disposables No Utensils)  ORAL HYDRATION; Water; 300 ml (10 oz)  ADULT ORAL NUTRITION SUPPLEMENT; Breakfast, Lunch, Dinner; Standard High Calorie/High Protein Oral Supplement   Isolation No active isolations     CURRENT SCHEDULED MEDICATIONS   Inpatient Medications     buprenorphine-naloxone, 4 tablet, SubLINGual, BID    OLANZapine, 5 mg, Oral, Daily    divalproex, 1,000 mg, Oral, Nightly    LORazepam, 0.5 mg, Oral, BID    [Held by provider] baclofen, 10 mg, Oral, BID    sodium chloride flush, 5-40 mL, IntraVENous, 2 times per day    [Held by provider] enoxaparin, 40 mg, SubCUTAneous, Daily    nicotine, 1 patch, TransDERmal, Daily    [Held by provider] traZODone, 150 mg, Oral, Nightly    [Held by provider] aspirin, 325 mg, Oral, Daily    [Held by provider] atomoxetine, 25 mg, Oral, Daily    [Held by provider] busPIRone, 7.5 mg, Oral, TID    [Held by provider] carvedilol, 6.25 mg, Oral, BID WC    [Held by provider] clopidogrel, 75 mg, Oral, Daily    ezetimibe, 10 mg, Oral, Daily    [Held by provider] linaclotide, 145 mcg, Oral, QAM AC    [Held by provider] rOPINIRole, 0.25 mg, Oral, QPM    rosuvastatin, 40 mg, Oral, Nightly    [Held by provider] venlafaxine, 75 mg, Oral, Daily    thiamine, 200 mg, IntraVENous, Daily   Infusions    sodium chloride

## 2024-12-03 NOTE — DISCHARGE INSTRUCTIONS
You were admitted to be evaluated for your acute behavioral changes. After imaging, seizure workup, and much other testing the etiology for your symptoms is thought to be psychiatric in nature.    Please follow up with your PCP, neurology, and [facility] in the next 1-2 weeks after discharge.    Please take your medications as prescribed, these are detailed in your after visit summary.    You should return to the emergency department if your symptoms recur, worsen, or do not resolve. In addition, return if:  You have a fever (greater than 101 degrees F),  You have chest pain, shortness of breath, abdominal pain, or uncontrollable vomiting,  You are unable to eat or drink,  You pass out,  You have difficulty moving your arms or legs,   You have difficulty speaking or slurred speech,  Or, you have any concern that you feel needs acute physician evaluation.    Thank you for allowing us to take care of you during your stay at [facility]. Have a great rest of your week!

## 2024-12-03 NOTE — CARE COORDINATION
CM following for discharge planning.  Patient is from home with family, currently on cEEG, has sitter, psych cleared, needs PT/OT when appropriate.  CM will continue to follow for discharge needs.    Beverly Taylor RN, BSN,    Ortho/Neuro   [phone]    
DME:    Patient expects to discharge to: Unknown  Plan for transportation at discharge: Other (see comment) (TBD)    Financial    Payor: [place] BCBS / Plan: [place]BS - [place] PPO / Product Type: *No Product type* /     Does insurance require precert for SNF: Yes    Potential assistance Purchasing Medications: No  Meds-[facility]-Beds request:        [id number] [place] [place] [facility] [address] [facility] [address] [facility] [address] - P [phone] - F [phone]  [address] [facility] [address] [address]  Phone: [phone] Fax: [phone]    [facility] [address] [phone] - F [phone]  [address] [facility] [address]  Phone: [phone] Fax: [phone]    [id number] - [place] - [address] [facility] [place] -  [phone] - F [phone]  [address] [facility] [address]  Phone: [phone] Fax: [phone]      Notes:    Factors facilitating achievement of predicted outcomes: Family support    Barriers to discharge: Medical Clearance     Additional Case Management Notes: Patient is from single-level-home with sister and uncle and is independent at baseline and drives. SW called patient's son to complete assessment since patient was not alert and oriented (Alex [phone]). Alex noted that patient has limited support from family if needed and is on Suboxome. SW following.     The Plan for Transition of Care is related to the following treatment goals of Seizure (HCC) [R56.9]  Seizures (HCC) [R56.9]    IF APPLICABLE: The Patient and/or patient representative Mayra and her family were provided with a choice of provider and agrees with the discharge plan. Freedom of choice list with basic dialogue that supports the patient's individualized plan of care/goals and shares the quality data associated with the providers was provided to:     Patient Representative Name:       The Patient and/or Patient Representative Agree with the Discharge Plan?      KESHA Ross  Case 
faxed: No    Durable Medical Equipment:  DME Provider: n/a  Equipment obtained during hospitalization: n/a    Home Oxygen and Respiratory Equipment:  Oxygen needed at discharge?: Not Indicated  Home Oxygen Company: Not Applicable  Portable tank available for discharge?: Not Indicated      Additional CM Notes:   Patient is discharging to sisters home.  CM spoke with patient at bedside who denies any other CM needs.  Patient requests lyft home, as she does not have a ride today.  Jennatricia arranged for 1300 to address confirmed on file.    COVID Result:  No results found for: \"COVID19\"    The Plan for Transition of Care is related to the following treatment goals of Seizure (HCC) [R56.9]  Seizures (HCC) [R56.9]    The Patient and/or patient representative Mayra and her family were provided with a choice of provider and agrees with the discharge plan Yes    Freedom of choice list was provided with basic dialogue that supports the patient's individualized plan of care/goals and shares the quality data associated with the providers. Yes    Care Transitions patient: No    Beverly Taylor RN  [facility]  Case Management Department  Ph: [phone]  Fax: [phone]

## 2024-12-03 NOTE — DISCHARGE SUMMARY
INTERNAL MEDICINE DEPARTMENT AT THE [facility]  DISCHARGE SUMMARY    Patient ID: Mayra Cavanaugh                                             Discharge Date: 12/3//2024   Patient's PCP: Steffany Argueta APRN - CNP                                          Discharge Physician: No att. providers found  Admit Date: 2024   Admitting Physician: No admitting provider for patient encounter.    PROBLEMS DURING HOSPITALIZATION:  Present on Admission:   Acute psychosis (HCC)   Acute encephalopathy      Brief HPI:    Ms. Mayra Cavanaugh is a 59 y.o. female with a MHx significant for, CAD (FABIO to circumflex 2017, proximal/mid RCA 2019), polysubstance use disorder (on suboxone), restless leg syndrome, HLD  who presented to [facility] from [facility] on  after having altered mental status.      Son Alex () provided majority of the history on admission. He describes his mother recently starting opioid and cocaine use several months ago with some people at work, which he has noticed in subtle personality changes from her baseline. He was able to convince her to pursue rehab and stop using, which she did and she was compliant with treatment. She progressively returned to her normal personality, described to be happy and independent with IADLs.      3 days prior to admission she visited family in [place] and was initially normal, but overnight noted to be confused and not sleeping. She became notably more confused/agitated, running around the house naked and asking/talking to her father who  6 years ago. Per son (and his aunt), there was no clear trigger to her rapid change in cognition, reported to be normal prior and no seizure like activity/ alcohol use/ substance use.      Patient was initially brought to [facility], noted to have fluctuating levels of alertness and orientation.     The following issues were addressed during hospitalization:      Acute encephalopathy  Unknown

## 2024-12-04 LAB — BETA-HYDROXYBUTYRATE: 0.06 MMOL/L (ref 0–0.27)

## 2025-03-31 RX ORDER — OLANZAPINE 5 MG/1
5 TABLET, FILM COATED ORAL DAILY
Qty: 90 TABLET | Refills: 1 | OUTPATIENT
Start: 2025-03-31